# Patient Record
Sex: FEMALE | Race: WHITE | Employment: FULL TIME | URBAN - METROPOLITAN AREA
[De-identification: names, ages, dates, MRNs, and addresses within clinical notes are randomized per-mention and may not be internally consistent; named-entity substitution may affect disease eponyms.]

---

## 2017-01-23 ENCOUNTER — OFFICE VISIT (OUTPATIENT)
Dept: INTERNAL MEDICINE CLINIC | Age: 27
End: 2017-01-23

## 2017-01-23 VITALS
WEIGHT: 120 LBS | HEART RATE: 90 BPM | TEMPERATURE: 96.9 F | HEIGHT: 69 IN | OXYGEN SATURATION: 96 % | SYSTOLIC BLOOD PRESSURE: 118 MMHG | RESPIRATION RATE: 14 BRPM | BODY MASS INDEX: 17.77 KG/M2 | DIASTOLIC BLOOD PRESSURE: 78 MMHG

## 2017-01-23 DIAGNOSIS — G43.809 OTHER MIGRAINE WITHOUT STATUS MIGRAINOSUS, NOT INTRACTABLE: Primary | ICD-10-CM

## 2017-01-23 DIAGNOSIS — A41.9 SEPSIS, DUE TO UNSPECIFIED ORGANISM: ICD-10-CM

## 2017-01-23 DIAGNOSIS — R11.0 NAUSEA: ICD-10-CM

## 2017-01-23 LAB
HCG URINE, QL. (POC): NEGATIVE
VALID INTERNAL CONTROL?: YES

## 2017-01-23 RX ORDER — SUMATRIPTAN 25 MG/1
TABLET, FILM COATED ORAL
Qty: 9 TAB | Refills: 1 | Status: SHIPPED | OUTPATIENT
Start: 2017-01-23 | End: 2017-12-20

## 2017-01-23 NOTE — PATIENT INSTRUCTIONS
1) follow-up as needed or sooner if worsening symptoms. Migraine Headache: Care Instructions  Your Care Instructions  Migraines are painful, throbbing headaches that often start on one side of the head. They may cause nausea and vomiting and make you sensitive to light, sound, or smell. Without treatment, migraines can last from 4 hours to a few days. Medicines can help prevent migraines or stop them after they have started. Your doctor can help you find which ones work best for you. Follow-up care is a key part of your treatment and safety. Be sure to make and go to all appointments, and call your doctor if you are having problems. It's also a good idea to know your test results and keep a list of the medicines you take. How can you care for yourself at home? · Do not drive if you have taken a prescription pain medicine. · Rest in a quiet, dark room until your headache is gone. Close your eyes, and try to relax or go to sleep. Don't watch TV or read. · Put a cold, moist cloth or cold pack on the painful area for 10 to 20 minutes at a time. Put a thin cloth between the cold pack and your skin. · Use a warm, moist towel or a heating pad set on low to relax tight shoulder and neck muscles. · Have someone gently massage your neck and shoulders. · Take your medicines exactly as prescribed. Call your doctor if you think you are having a problem with your medicine. You will get more details on the specific medicines your doctor prescribes. · Be careful not to take pain medicine more often than the instructions allow. You could get worse or more frequent headaches when the medicine wears off. To prevent migraines  · Keep a headache diary so you can figure out what triggers your headaches. Avoiding triggers may help you prevent headaches. Record when each headache began, how long it lasted, and what the pain was like.  (Was it throbbing, aching, stabbing, or dull?) Write down any other symptoms you had with the headache, such as nausea, flashing lights or dark spots, or sensitivity to bright light or loud noise. Note if the headache occurred near your period. List anything that might have triggered the headache. Triggers may include certain foods (chocolate, cheese, wine) or odors, smoke, bright light, stress, or lack of sleep. · If your doctor has prescribed medicine for your migraines, take it as directed. You may have medicine that you take only when you get a migraine and medicine that you take all the time to help prevent migraines. ¨ If your doctor has prescribed medicine for when you get a headache, take it at the first sign of a migraine, unless your doctor has given you other instructions. ¨ If your doctor has prescribed medicine to prevent migraines, take it exactly as prescribed. Call your doctor if you think you are having a problem with your medicine. · Find healthy ways to deal with stress. Migraines are most common during or right after stressful times. Take time to relax before and after you do something that has caused a migraine in the past.  · Try to keep your muscles relaxed by keeping good posture. Check your jaw, face, neck, and shoulder muscles for tension. Try to relax them. When you sit at a desk, change positions often. And make sure to stretch for 30 seconds each hour. · Get plenty of sleep and exercise. · Eat meals on a regular schedule. Avoid foods and drinks that often trigger migraines. These include chocolate, alcohol (especially red wine and port), aspartame, monosodium glutamate (MSG), and some additives found in foods (such as hot dogs, jean baptiste, cold cuts, aged cheeses, and pickled foods). · Limit caffeine. Don't drink too much coffee, tea, or soda. But don't quit caffeine suddenly. That can also give you migraines. · Do not smoke or allow others to smoke around you. If you need help quitting, talk to your doctor about stop-smoking programs and medicines.  These can increase your chances of quitting for good. · If you are taking birth control pills or hormone therapy, talk to your doctor about whether they are triggering your migraines. When should you call for help? Call 911 anytime you think you may need emergency care. For example, call if:  · You have signs of a stroke. These may include:  ¨ Sudden numbness, paralysis, or weakness in your face, arm, or leg, especially on only one side of your body. ¨ Sudden vision changes. ¨ Sudden trouble speaking. ¨ Sudden confusion or trouble understanding simple statements. ¨ Sudden problems with walking or balance. ¨ A sudden, severe headache that is different from past headaches. Call your doctor now or seek immediate medical care if:  · You have new or worse nausea and vomiting. · You have a new or higher fever. · Your headache gets much worse. Watch closely for changes in your health, and be sure to contact your doctor if:  · You are not getting better after 2 days (48 hours). Where can you learn more? Go to http://alina-malena.info/. Enter T107 in the search box to learn more about \"Migraine Headache: Care Instructions. \"  Current as of: February 19, 2016  Content Version: 11.1  © 4533-5576 [employer]. Care instructions adapted under license by [employer] (which disclaims liability or warranty for this information). If you have questions about a medical condition or this instruction, always ask your healthcare professional. [place] [address] any warranty or liability for your use of this information.

## 2017-01-23 NOTE — PROGRESS NOTES
ROOM # 1    Bela Shaffer presents today for   Chief Complaint   Patient presents with    Headache       Bela Shaffer preferred language for health care discussion is english/other. Is someone accompanying this pt? no    Is the patient using any DME equipment during [address]? no    Depression Screening:  PHQ 2 / 9, over the last two weeks 5/10/2016   Little interest or pleasure in doing things Not at all   Feeling down, depressed or hopeless Not at all   Total Score PHQ 2 0       Learning Assessment:  Learning Assessment 5/10/2016   PRIMARY LEARNER Patient   PRIMARY LANGUAGE Jamaican   LEARNER PREFERENCE PRIMARY DEMONSTRATION     READING   ANSWERED BY patient   RELATIONSHIP SELF       Abuse Screening:  No flowsheet data found. Fall Risk  No flowsheet data found. Health Maintenance reviewed and discussed per provider. Yes    Advance Directive:  1. Do you have an advance directive in place? Patient Reply: no    2. If not, would you like material regarding how to put one in place? Patient Reply: no    Coordination of Care:  1. Have you been to the ER, urgent care clinic since your last visit? Hospitalized since your last visit? Yes pt was in hospital in [place] 9/2016 for sepsis and tonsillitis     2. Have you seen or consulted any other health care providers outside of the [facility] since your last visit? Include any pap smears or colon screening.  no

## 2017-01-23 NOTE — PROGRESS NOTES
Chief Complaint   Patient presents with    Headache       HPI:     Monica Diallo is a 27 y.o.  female with history of GERD  here for the above complaint. She has a headache for 1 week. She has dizziness and nausea. Headache is on the top of her head. When she feels dizzy, she has blurred vision. No vomiting. Lights makes her headaches worse. Her last period was 2 weeks. She has lower abdominal pain, but no chest pain or shortness of breath. No history of migraines. Sometimes the headache is sharp, but changes all the time. It is a constant headache. Pain scale: 5-6/10. She has tried advil without. No excedrin migraine. No nasal congestion, cough, itchy watery eyes. She was admitted on at :  [facility]   [address], [facility], [address] [place] [address]   [phone]    From 9/8/16-9/10/16 for tonsillitis and sepsis. She was given IV unasyn and discharged home with augmentin. Her WBC was elevated to 21. She needed repeat CBC in 1 week after discharge, but never followed up with me. Past Medical History   Diagnosis Date    GERD (gastroesophageal reflux disease)      History reviewed. No pertinent past surgical history. Current Outpatient Prescriptions   Medication Sig    SUMAtriptan (IMITREX) 25 mg tablet Take one po at onset of headaches and then one every 2hrs prn headaches with maximum of 4 in 24 hrs    norgestimate-ethinyl estradiol (ORTHO-CYCLEN) 0.25-35 mg-mcg tab Take 1 Tab by mouth daily. No current facility-administered medications for this visit. Health Maintenance   Topic Date Due    HPV AGE 9Y-34Y (1 of 3 - Female 3 Dose Series) 04/11/2003    DTaP/Tdap/Td series (1 - Tdap) 04/11/2013    PAP AKA CERVICAL CYTOLOGY  04/11/2013    INFLUENZA AGE 9 TO ADULT  Addressed       There is no immunization history on file for this patient. Patient's last menstrual period was 01/09/2017 (exact date).         Allergies and Intolerances:   No Known Allergies    Family History:   Family History   Problem Relation Age of Onset    Hypertension Mother     Hypertension Father        Social History:   She  reports that she has never smoked. She has never used smokeless tobacco.  She  reports that she drinks alcohol. ·     OBJECTIVE:   Physical exam:   Visit Vitals    /78    Pulse 90    Temp 96.9 °F (36.1 °C) (Oral)    Resp 14    Ht 5' 9\" (1.753 m)    Wt 120 lb (54.4 kg)    LMP 01/09/2017 (Exact Date)    SpO2 96%    BMI 17.72 kg/m2        Generally: Pleasant female in no acute distress  HEENT Exam: Head: atraumatic, acephalic                Eyes: PERRLA     Ears: bilaterally normal TM, no erythema or exudate, normal light reflex    Nares: mucosal membranes moist, no erythema    Mouth: Clear, no erythema or exudate    Neck: supple, no LAD, negative carotid bruits bilaterally    Cardiac Exam: regular, rate, and rhythm. Normal S1 and S2. No murmurs, gallops, or rubs  Pulmonary exam: Clear to auscultation bilaterally      LABS/RADIOLOGICAL TESTS:    All lab results and radiological studies were reviewed and discussed with the patient. ASSESSMENT/PLAN:    1. Other migraine without status migrainosus, not intractable  -     SUMAtriptan (IMITREX) 25 mg tablet; Take one po at onset of headaches and then one every 2hrs prn headaches with maximum of 4 in 24 hrs    2. Nausea  -     AMB POC URINE PREGNANCY TEST, VISUAL COLOR COMPARISON    3. Sepsis, due to unspecified organism ([facility] [address]  -     CBC WITH AUTOMATED DIFF; Future      4. Requested Prescriptions     Signed Prescriptions Disp Refills    SUMAtriptan (IMITREX) 25 mg tablet 9 Tab 1     Sig: Take one po at onset of headaches and then one every 2hrs prn headaches with maximum of 4 in 24 hrs     5. Patient verbalized understanding and agreement with the plan. 6. Patient was given an after-visit summary. 7.   Follow-up Disposition:  Return if symptoms worsen or fail to improve.  or sooner if worsening symptoms.           Jaymie Salcedo MD

## 2017-01-23 NOTE — MR AVS SNAPSHOT
Visit Information Date & Time Provider Department Dept. Phone Encounter #  
 1/23/2017  8:45 AM Ginger Vang MD [facility] [phone] [id number] Follow-up Instructions Return if symptoms worsen or fail to improve. Upcoming Health Maintenance Date Due  
 HPV AGE 9Y-34Y (1 of 3 - Female 3 Dose Series) 4/11/2003 DTaP/Tdap/Td series (1 - Tdap) 4/11/2013 PAP AKA CERVICAL CYTOLOGY 4/11/2013 Allergies as of 1/23/2017  Review Complete On: 1/23/2017 By: Wing Curry MD  
 No Known Allergies Current Immunizations  Reviewed on 5/10/2016 No immunizations on file. Not reviewed this visit You Were Diagnosed With   
  
 Codes Comments Other migraine without status migrainosus, not intractable    -  Primary ICD-10-CM: F60.700 Nausea     ICD-10-CM: R11.0 ICD-9-CM: 787.02 Sepsis, due to unspecified organism [facility])     ICD-10-CM: A41.9 ICD-9-CM: 038.9, 995.91 Vitals BP Pulse Temp Resp Height(growth percentile) Weight(growth percentile) 118/78 90 96.9 °F (36.1 °C) (Oral) 14 5' 9\" (1.753 m) 120 lb (54.4 kg) LMP SpO2 BMI OB Status Smoking Status 01/09/2017 (Exact Date) 96% 17.72 kg/m2 Having regular periods Never Smoker BMI and BSA Data Body Mass Index Body Surface Area  
 17.72 kg/m 2 1.63 m 2 Preferred Pharmacy Pharmacy Name Phone [facility] [address] [facility], [address] Your Updated Medication List  
  
   
This list is accurate as of: 1/23/17  9:27 AM.  Always use your most recent med list.  
  
  
  
  
 norgestimate-ethinyl estradiol 0.25-35 mg-mcg Tab Commonly known as:  Makayla Party Take 1 Tab by mouth daily. SUMAtriptan 25 mg tablet Commonly known as:  IMITREX Take one po at onset of headaches and then one every 2hrs prn headaches with maximum of 4 in 24 hrs Prescriptions Sent to Pharmacy Refills SUMAtriptan (IMITREX) 25 mg tablet 1 Sig: Take one po at onset of headaches and then one every 2hrs prn headaches with maximum of 4 in 24 hrs Class: Normal  
 Pharmacy: [address], [address] [place] [address] #: [phone] We Performed the Following AMB POC URINE PREGNANCY TEST, VISUAL COLOR COMPARISON [78604 CPT(R)] Follow-up Instructions Return if symptoms worsen or fail to improve. To-Do List   
 01/23/2017 Lab:  CBC WITH AUTOMATED DIFF Patient Instructions 1) follow-up as needed or sooner if worsening symptoms. Migraine Headache: Care Instructions Your Care Instructions Migraines are painful, throbbing headaches that often start on one side of the head. They may cause nausea and vomiting and make you sensitive to light, sound, or smell. Without treatment, migraines can last from 4 hours to a few days. Medicines can help prevent migraines or stop them after they have started. Your doctor can help you find which ones work best for you. Follow-up care is a key part of your treatment and safety. Be sure to make and go to all appointments, and call your doctor if you are having problems. It's also a good idea to know your test results and keep a list of the medicines you take. How can you care for yourself at home? · Do not drive if you have taken a prescription pain medicine. · Rest in a quiet, dark room until your headache is gone. Close your eyes, and try to relax or go to sleep. Don't watch TV or read. · Put a cold, moist cloth or cold pack on the painful area for 10 to 20 minutes at a time. Put a thin cloth between the cold pack and your skin. · Use a warm, moist towel or a heating pad set on low to relax tight shoulder and neck muscles. · Have someone gently massage your neck and shoulders. · Take your medicines exactly as prescribed.  Call your doctor if you think you are having a problem with your medicine. You will get more details on the specific medicines your doctor prescribes. · Be careful not to take pain medicine more often than the instructions allow. You could get worse or more frequent headaches when the medicine wears off. To prevent migraines · Keep a headache diary so you can figure out what triggers your headaches. Avoiding triggers may help you prevent headaches. Record when each headache began, how long it lasted, and what the pain was like. (Was it throbbing, aching, stabbing, or dull?) Write down any other symptoms you had with the headache, such as nausea, flashing lights or dark spots, or sensitivity to bright light or loud noise. Note if the headache occurred near your period. List anything that might have triggered the headache. Triggers may include certain foods (chocolate, cheese, wine) or odors, smoke, bright light, stress, or lack of sleep. · If your doctor has prescribed medicine for your migraines, take it as directed. You may have medicine that you take only when you get a migraine and medicine that you take all the time to help prevent migraines. ¨ If your doctor has prescribed medicine for when you get a headache, take it at the first sign of a migraine, unless your doctor has given you other instructions. ¨ If your doctor has prescribed medicine to prevent migraines, take it exactly as prescribed. Call your doctor if you think you are having a problem with your medicine. · Find healthy ways to deal with stress. Migraines are most common during or right after stressful times. Take time to relax before and after you do something that has caused a migraine in the past. 
· Try to keep your muscles relaxed by keeping good posture. Check your jaw, face, neck, and shoulder muscles for tension. Try to relax them. When you sit at a desk, change positions often. And make sure to stretch for 30 seconds each hour. · Get plenty of sleep and exercise. · Eat meals on a regular schedule. Avoid foods and drinks that often trigger migraines. These include chocolate, alcohol (especially red wine and port), aspartame, monosodium glutamate (MSG), and some additives found in foods (such as hot dogs, jean baptiste, cold cuts, aged cheeses, and pickled foods). · Limit caffeine. Don't drink too much coffee, tea, or soda. But don't quit caffeine suddenly. That can also give you migraines. · Do not smoke or allow others to smoke around you. If you need help quitting, talk to your doctor about stop-smoking programs and medicines. These can increase your chances of quitting for good. · If you are taking birth control pills or hormone therapy, talk to your doctor about whether they are triggering your migraines. When should you call for help? Call 911 anytime you think you may need emergency care. For example, call if: 
· You have signs of a stroke. These may include: 
¨ Sudden numbness, paralysis, or weakness in your face, arm, or leg, especially on only one side of your body. ¨ Sudden vision changes. ¨ Sudden trouble speaking. ¨ Sudden confusion or trouble understanding simple statements. ¨ Sudden problems with walking or balance. ¨ A sudden, severe headache that is different from past headaches. Call your doctor now or seek immediate medical care if: 
· You have new or worse nausea and vomiting. · You have a new or higher fever. · Your headache gets much worse. Watch closely for changes in your health, and be sure to contact your doctor if: 
· You are not getting better after 2 days (48 hours). Where can you learn more? Go to http://alina-malena.info/. Enter T698 in the search box to learn more about \"Migraine Headache: Care Instructions. \" Current as of: February 19, 2016 Content Version: 11.1 © 0833-5117 [employer].  Care instructions adapted under license by [address] (which disclaims liability or warranty for this information). If you have questions about a medical condition or this instruction, always ask your healthcare professional. [address] any warranty or liability for your use of this information. Introducing [facility]! Shelby Villalobos introduces [facility] patient portal. Now you can access parts of your medical record, email your doctor's office, and request medication refills online. 1. In your internet browser, go to [email or web address] 2. Click on the First Time User? Click Here link in the Sign In box. You will see the New Member Sign Up page. 3. Enter your [facility] Access Code exactly as it appears below. You will not need to use this code after youve completed the sign-up process. If you do not sign up before the expiration date, you must request a new code. · [facility] Access Code: [email or web address] Expires: 4/23/2017  9:27 AM 
 
4. Enter the last four digits of your Social Security Number (xxxx) and Date of Birth (mm/dd/yyyy) as indicated and click Submit. You will be taken to the next sign-up page. 5. Create a [facility] ID. This will be your [facility] login ID and cannot be changed, so think of one that is secure and easy to remember. 6. Create a [facility] password. You can change your password at any time. 7. Enter your Password Reset Question and Answer. This can be used at a later time if you forget your password. 8. Enter your e-mail address. You will receive e-mail notification when new information is available in [address] 9. Click Sign Up. You can now view and download portions of your medical record. 10. Click the Download Summary menu link to download a portable copy of your medical information. If you have questions, please visit the Frequently Asked Questions section of the [employer] website.  Remember, [facility] is NOT to be used for urgent needs. For medical emergencies, dial 911. Now available from your iPhone and Android! Please provide this summary of care documentation to your next provider. If you have any questions after today's visit, please call [phone].

## 2017-01-24 LAB
BASOPHILS # BLD AUTO: 0 X10E3/UL (ref 0–0.2)
BASOPHILS NFR BLD AUTO: 0 %
EOSINOPHIL # BLD AUTO: 0.3 X10E3/UL (ref 0–0.4)
EOSINOPHIL NFR BLD AUTO: 4 %
ERYTHROCYTE [DISTWIDTH] IN BLOOD BY AUTOMATED COUNT: 12.7 % (ref 12.3–15.4)
HCT VFR BLD AUTO: 40.8 % (ref 34–46.6)
HGB BLD-MCNC: 13.7 G/DL (ref 11.1–15.9)
IMM GRANULOCYTES # BLD: 0 X10E3/UL (ref 0–0.1)
IMM GRANULOCYTES NFR BLD: 0 %
LYMPHOCYTES # BLD AUTO: 3.2 X10E3/UL (ref 0.7–3.1)
LYMPHOCYTES NFR BLD AUTO: 48 %
MCH RBC QN AUTO: 30.2 PG (ref 26.6–33)
MCHC RBC AUTO-ENTMCNC: 33.6 G/DL (ref 31.5–35.7)
MCV RBC AUTO: 90 FL (ref 79–97)
MONOCYTES # BLD AUTO: 0.5 X10E3/UL (ref 0.1–0.9)
MONOCYTES NFR BLD AUTO: 8 %
NEUTROPHILS # BLD AUTO: 2.6 X10E3/UL (ref 1.4–7)
NEUTROPHILS NFR BLD AUTO: 40 %
PLATELET # BLD AUTO: 183 X10E3/UL (ref 150–379)
RBC # BLD AUTO: 4.53 X10E6/UL (ref 3.77–5.28)
WBC # BLD AUTO: 6.6 X10E3/UL (ref 3.4–10.8)

## 2017-12-20 ENCOUNTER — OFFICE VISIT (OUTPATIENT)
Dept: INTERNAL MEDICINE CLINIC | Age: 27
End: 2017-12-20

## 2017-12-20 VITALS
HEART RATE: 80 BPM | HEIGHT: 69 IN | SYSTOLIC BLOOD PRESSURE: 113 MMHG | DIASTOLIC BLOOD PRESSURE: 74 MMHG | TEMPERATURE: 97.5 F | WEIGHT: 125.4 LBS | BODY MASS INDEX: 18.57 KG/M2 | OXYGEN SATURATION: 99 % | RESPIRATION RATE: 18 BRPM

## 2017-12-20 DIAGNOSIS — R10.2 PELVIC PAIN: Primary | ICD-10-CM

## 2017-12-20 NOTE — PATIENT INSTRUCTIONS
1) you can take tylenol, but no more than 4000mg a day for pain. 2) follow-up as needed or sooner if worsening symptoms. Pelvic Pain: Care Instructions  Your Care Instructions    Pelvic pain, or pain in the lower belly, can have many causes. Often pelvic pain is not serious and gets better in a few days. If your pain continues or gets worse, you may need tests and treatment. Tell your doctor about any new symptoms. These may be signs of a serious problem. Follow-up care is a key part of your treatment and safety. Be sure to make and go to all appointments, and call your doctor if you are having problems. It's also a good idea to know your test results and keep a list of the medicines you take. How can you care for yourself at home? · Rest until you feel better. Lie down, and raise your legs by placing a pillow under your knees. · Drink plenty of fluids. You may find that small, frequent sips are easier on your stomach than if you drink a lot at once. Avoid drinks with carbonation or caffeine, such as soda pop, tea, or coffee. · Try eating several small meals instead of 2 or 3 large ones. Eat mild foods, such as rice, dry toast or crackers, bananas, and applesauce. Avoid fatty and spicy foods, other fruits, and alcohol until 48 hours after your symptoms have gone away. · Take an over-the-counter pain medicine, such as acetaminophen (Tylenol), ibuprofen (Advil, Motrin), or naproxen (Aleve). Read and follow all instructions on the label. · Do not take two or more pain medicines at the same time unless the doctor told you to. Many pain medicines have acetaminophen, which is Tylenol. Too much acetaminophen (Tylenol) can be harmful. · You can put a heating pad, a warm cloth, or moist heat on your belly to relieve pain. When should you call for help? Call your doctor now or seek immediate medical care if:  · You have a new or higher fever. · You have unusual vaginal bleeding.   · You have new or worse belly or pelvic pain. · You have vaginal discharge that has increased in amount or smells bad. Watch closely for changes in your health, and be sure to contact your doctor if:  · You do not get better as expected. Where can you learn more? Go to http://alina-malena.info/. Enter [phone] in the search box to learn more about \"Pelvic Pain: Care Instructions. \"  Current as of: October 13, 2016  Content Version: 11.4  © 3334-1925 [employer]. Care instructions adapted under license by [employer] (which disclaims liability or warranty for this information). If you have questions about a medical condition or this instruction, always ask your healthcare professional. [address] any warranty or liability for your use of this information.

## 2017-12-20 NOTE — MR AVS SNAPSHOT
Visit Information Date & Time Provider Department Dept. Phone Encounter #  
 12/20/2017  8:30 AM Eze Guevara MD [address] [phone] [id number] Follow-up Instructions Return if symptoms worsen or fail to improve. Upcoming Health Maintenance Date Due  
 HPV AGE 9Y-34Y (1 of 3 - Female 3 Dose Series) 4/11/2003 PAP AKA CERVICAL CYTOLOGY 4/11/2013 DTaP/Tdap/Td series (2 - Td) 12/20/2027 Allergies as of 12/20/2017  Review Complete On: 12/20/2017 By: Eze Guevara MD  
 No Known Allergies Current Immunizations  Reviewed on 5/10/2016 No immunizations on file. Not reviewed this visit You Were Diagnosed With   
  
 Codes Comments Pelvic pain    -  Primary ICD-10-CM: R10.2 ICD-9-CM: [id number] Vitals BP Pulse Temp Resp Height(growth percentile) Weight(growth percentile) 113/74 (BP 1 Location: Left arm, BP Patient Position: Sitting) 80 97.5 °F (36.4 °C) (Oral) 18 5' 9\" (1.753 m) 125 lb 6.4 oz (56.9 kg) LMP SpO2 BMI OB Status Smoking Status 11/15/2017 99% 18.52 kg/m2 Having regular periods Never Smoker Vitals History BMI and BSA Data Body Mass Index Body Surface Area 18.52 kg/m 2 1.66 m 2 Preferred Pharmacy Pharmacy Name Phone [facility] [address] [facility], [address] [place] [address] Your Updated Medication List  
  
Notice  As of 12/20/2017  9:05 AM  
 You have not been prescribed any medications. Follow-up Instructions Return if symptoms worsen or fail to improve. To-Do List   
 12/20/2017 Lab:  CBC WITH AUTOMATED DIFF   
  
 12/20/2017 Lab:  METABOLIC PANEL, COMPREHENSIVE   
  
 12/20/2017 Lab:  URINALYSIS W/ RFLX MICROSCOPIC   
  
 12/21/2017 Imaging:  US PELV NON OBS  LTD Patient Instructions 1) you can take tylenol, but no more than 4000mg a day for pain. 2) follow-up as needed or sooner if worsening symptoms. Pelvic Pain: Care Instructions Your Care Instructions Pelvic pain, or pain in the lower belly, can have many causes. Often pelvic pain is not serious and gets better in a few days. If your pain continues or gets worse, you may need tests and treatment. Tell your doctor about any new symptoms. These may be signs of a serious problem. Follow-up care is a key part of your treatment and safety. Be sure to make and go to all appointments, and call your doctor if you are having problems. It's also a good idea to know your test results and keep a list of the medicines you take. How can you care for yourself at home? · Rest until you feel better. Lie down, and raise your legs by placing a pillow under your knees. · Drink plenty of fluids. You may find that small, frequent sips are easier on your stomach than if you drink a lot at once. Avoid drinks with carbonation or caffeine, such as soda pop, tea, or coffee. · Try eating several small meals instead of 2 or 3 large ones. Eat mild foods, such as rice, dry toast or crackers, bananas, and applesauce. Avoid fatty and spicy foods, other fruits, and alcohol until 48 hours after your symptoms have gone away. · Take an over-the-counter pain medicine, such as acetaminophen (Tylenol), ibuprofen (Advil, Motrin), or naproxen (Aleve). Read and follow all instructions on the label. · Do not take two or more pain medicines at the same time unless the doctor told you to. Many pain medicines have acetaminophen, which is Tylenol. Too much acetaminophen (Tylenol) can be harmful. · You can put a heating pad, a warm cloth, or moist heat on your belly to relieve pain. When should you call for help? Call your doctor now or seek immediate medical care if: 
· You have a new or higher fever. · You have unusual vaginal bleeding. · You have new or worse belly or pelvic pain. · You have vaginal discharge that has increased in amount or smells bad. Watch closely for changes in your health, and be sure to contact your doctor if: 
· You do not get better as expected. Where can you learn more? Go to http://alina-malena.info/. Enter [phone] in the search box to learn more about \"Pelvic Pain: Care Instructions. \" Current as of: October 13, 2016 Content Version: 11.4 © 3718-4589 [employer]iva [employer]. Care instructions adapted under license by [employer] (which disclaims liability or warranty for this information). If you have questions about a medical condition or this instruction, always ask your healthcare professional. [address] any warranty or liability for your use of this information. Introducing [facility] & HEALTH SERVICES! Julito Dai introduces [employer] patient portal. Now you can access parts of your medical record, email your doctor's office, and request medication refills online. 1. In your internet browser, go to [email or web address] 2. Click on the First Time User? Click Here link in the Sign In box. You will see the New Member Sign Up page. 3. Enter your Merc[employer] Access Code exactly as it appears below. You will not need to use this code after youve completed the sign-up process. If you do not sign up before the expiration date, you must request a new code. · [employer] Access Code: [email or web address] Expires: 3/20/2018  9:05 AM 
 
4. Enter the last four digits of your Social Security Number (xxxx) and Date of Birth (mm/dd/yyyy) as indicated and click Submit. You will be taken to the next sign-up page. 5. Create a Merc[employer]ec ID. This will be your [employer] login ID and cannot be changed, so think of one that is secure and easy to remember. 6. Create a [employer] password. You can change your password at any time. 7. Enter your Password Reset Question and Answer.  This can be used at a later time if you forget your password. 8. Enter your e-mail address. You will receive e-mail notification when new information is available in [address] 9. Click Sign Up. You can now view and download portions of your medical record. 10. Click the Download Summary menu link to download a portable copy of your medical information. If you have questions, please visit the Frequently Asked Questions section of the Simple Life[employer] website. Remember, Simple Life[employer] is NOT to be used for urgent needs. For medical emergencies, dial 911. Now available from your iPhone and Android! Please provide this summary of care documentation to your next provider. If you have any questions after today's visit, please call [phone].

## 2017-12-20 NOTE — PROGRESS NOTES
Chief Complaint   Patient presents with    Pelvic Pain     2 weeks intermittenly       HPI:     Anastacia Phipps is a 24 y.o.  female with history of GERD  here for the above complaint. She is having lower pelvic pain for 2 weeks. Off and on. Sharp pain. Pain scale; 6/10. No dysuria, hematuria, increase urgency or frequency. Pt is not pregnant. No constipation or diarrhea. She said her periods have always been irregular. Sometimes every month or every other month. No chest pain, shortness of breath, abdominal pain, nausea or vomiting. She said her periods are normal in the sense, she does not get heavy bleeding. She said tylenol helps some with the pain. Past Medical History:   Diagnosis Date    GERD (gastroesophageal reflux disease)      History reviewed. No pertinent surgical history. No current outpatient prescriptions on file. No current facility-administered medications for this visit. Health Maintenance   Topic Date Due    HPV AGE 9Y-34Y (1 of 3 - Female 3 Dose Series) 04/11/2003    PAP AKA CERVICAL CYTOLOGY  04/11/2013    DTaP/Tdap/Td series (2 - Td) 12/20/2027    Influenza Age 9 to Adult  Addressed       There is no immunization history on file for this patient. Patient's last menstrual period was 11/15/2017. Allergies and Intolerances:   No Known Allergies    Family History:   Family History   Problem Relation Age of Onset    Hypertension Mother     Hypertension Father        Social History:   She  reports that she has never smoked. She has never used smokeless tobacco.  She  reports that she drinks alcohol.               ·     OBJECTIVE:   Physical exam:   Visit Vitals    /74 (BP 1 Location: Left arm, BP Patient Position: Sitting)    Pulse 80    Temp 97.5 °F (36.4 °C) (Oral)    Resp 18    Ht 5' 9\" (1.753 m)    Wt 125 lb 6.4 oz (56.9 kg)    LMP 11/15/2017    SpO2 99%    BMI 18.52 kg/m2        Generally: Pleasant female in no acute distress  Cardiac Exam: regular, rate, and rhythm. Normal S1 and S2. No murmurs, gallops, or rubs  Pulmonary exam: Clear to auscultation bilaterally  Abdominal exam: Positive bowel sounds in all four quadrants, soft, nondistended, TTP in the suprapubic area and bilateral pelvic areas  Extremities: 2+ dorsalis pedis pulses bilaterally. No pedal edema    bilaterally    LABS/RADIOLOGICAL TESTS:  Lab Results   Component Value Date/Time    WBC 6.6 01/23/2017 09:49 AM    HGB 13.7 01/23/2017 09:49 AM    HCT 40.8 01/23/2017 09:49 AM    PLATELET 749 59/24/0198 09:49 AM     Lab Results   Component Value Date/Time    Sodium 138 05/10/2016 01:51 PM    Potassium 3.9 05/10/2016 01:51 PM    Chloride 101 05/10/2016 01:51 PM    CO2 23 05/10/2016 01:51 PM    Glucose 82 05/10/2016 01:51 PM    BUN 8 05/10/2016 01:51 PM    Creatinine 0.40 05/10/2016 01:51 PM     No results found for: CHOL, CHOLX, CHLST, CHOLV, HDL, LDL, LDLC, DLDLP, TGLX, TRIGL, TRIGP  No results found for: GPT  Previous labs    ASSESSMENT/PLAN:    1. Pelvic pain: pt refuses pregnancy test. Will r/o other etiologies such as UTI, ovarian cyst., uterine fibroid. -     URINALYSIS W/ RFLX MICROSCOPIC; Future  -     METABOLIC PANEL, COMPREHENSIVE; Future  -     CBC WITH AUTOMATED DIFF; Future  -     US PELV NON OBS  LTD; Future    2. Patient verbalized understanding and agreement with the plan. 3. Patient was given an after-visit summary. 4. Follow-up Disposition:  Return if symptoms worsen or fail to improve. or sooner if worsening symptoms.           Tara Hagan M.D.

## 2017-12-20 NOTE — PROGRESS NOTES
ROOM # 2    Marcos Cheek presents today for   Chief Complaint   Patient presents with    Pelvic Pain     2 weeks intermittenly       Marcos Cheek preferred language for health care discussion is english/other. Is someone accompanying this pt? no    Is the patient using any DME equipment during OV? no    Depression Screening:  PHQ over the last two weeks 12/20/2017 5/10/2016   Little interest or pleasure in doing things Not at all Not at all   Feeling down, depressed or hopeless Not at all Not at all   Total Score PHQ 2 0 0       Learning Assessment:  Learning Assessment 5/10/2016   PRIMARY LEARNER Patient   PRIMARY LANGUAGE Citizen of [place]   LEARNER PREFERENCE PRIMARY DEMONSTRATION     READING   ANSWERED BY patient   RELATIONSHIP SELF       Abuse Screening:  Abuse Screening Questionnaire 12/20/2017   Do you ever feel afraid of your partner? N   Are you in a relationship with someone who physically or mentally threatens you? N   Is it safe for you to go home? Y       Fall Risk  No flowsheet data found. Health Maintenance reviewed and discussed per provider. Yes    Marcos Cheek is due for pap smear . Please order/place referral if appropriate. Advance Directive:  1. Do you have an advance directive in place? Patient Reply: no    2. If not, would you like material regarding how to put one in place? Patient Reply: no    Coordination of Care:  1. Have you been to the ER, urgent care clinic since your last visit? Hospitalized since your last visit? no    2. Have you seen or consulted any other health care providers outside of the [address] [place] [address] since your last visit? Include any pap smears or colon screening.  no

## 2017-12-21 LAB
ALBUMIN SERPL-MCNC: 4.5 G/DL (ref 3.5–5.5)
ALBUMIN/GLOB SERPL: 1.7 {RATIO} (ref 1.2–2.2)
ALP SERPL-CCNC: 55 IU/L (ref 39–117)
ALT SERPL-CCNC: 28 IU/L (ref 0–32)
APPEARANCE UR: CLEAR
AST SERPL-CCNC: 21 IU/L (ref 0–40)
BASOPHILS # BLD AUTO: 0 X10E3/UL (ref 0–0.2)
BASOPHILS NFR BLD AUTO: 0 %
BILIRUB SERPL-MCNC: 0.7 MG/DL (ref 0–1.2)
BILIRUB UR QL STRIP: NEGATIVE
BUN SERPL-MCNC: 10 MG/DL (ref 6–20)
BUN/CREAT SERPL: 20 (ref 9–23)
CALCIUM SERPL-MCNC: 9 MG/DL (ref 8.7–10.2)
CHLORIDE SERPL-SCNC: 102 MMOL/L (ref 96–106)
CO2 SERPL-SCNC: 23 MMOL/L (ref 18–29)
COLOR UR: YELLOW
CREAT SERPL-MCNC: 0.49 MG/DL (ref 0.57–1)
EOSINOPHIL # BLD AUTO: 0.2 X10E3/UL (ref 0–0.4)
EOSINOPHIL NFR BLD AUTO: 2 %
ERYTHROCYTE [DISTWIDTH] IN BLOOD BY AUTOMATED COUNT: 13 % (ref 12.3–15.4)
GLOBULIN SER CALC-MCNC: 2.6 G/DL (ref 1.5–4.5)
GLUCOSE SERPL-MCNC: 89 MG/DL (ref 65–99)
GLUCOSE UR QL: NEGATIVE
HCT VFR BLD AUTO: 40.5 % (ref 34–46.6)
HGB BLD-MCNC: 13.3 G/DL (ref 11.1–15.9)
HGB UR QL STRIP: NEGATIVE
IMM GRANULOCYTES # BLD: 0 X10E3/UL (ref 0–0.1)
IMM GRANULOCYTES NFR BLD: 0 %
KETONES UR QL STRIP: NEGATIVE
LEUKOCYTE ESTERASE UR QL STRIP: NEGATIVE
LYMPHOCYTES # BLD AUTO: 3.1 X10E3/UL (ref 0.7–3.1)
LYMPHOCYTES NFR BLD AUTO: 45 %
MCH RBC QN AUTO: 30.2 PG (ref 26.6–33)
MCHC RBC AUTO-ENTMCNC: 32.8 G/DL (ref 31.5–35.7)
MCV RBC AUTO: 92 FL (ref 79–97)
MICRO URNS: NORMAL
MONOCYTES # BLD AUTO: 0.6 X10E3/UL (ref 0.1–0.9)
MONOCYTES NFR BLD AUTO: 9 %
NEUTROPHILS # BLD AUTO: 3 X10E3/UL (ref 1.4–7)
NEUTROPHILS NFR BLD AUTO: 44 %
NITRITE UR QL STRIP: NEGATIVE
PH UR STRIP: 5.5 [PH] (ref 5–7.5)
PLATELET # BLD AUTO: 177 X10E3/UL (ref 150–379)
POTASSIUM SERPL-SCNC: 4 MMOL/L (ref 3.5–5.2)
PROT SERPL-MCNC: 7.1 G/DL (ref 6–8.5)
PROT UR QL STRIP: NEGATIVE
RBC # BLD AUTO: 4.41 X10E6/UL (ref 3.77–5.28)
SODIUM SERPL-SCNC: 137 MMOL/L (ref 134–144)
SP GR UR: 1.02 (ref 1–1.03)
UROBILINOGEN UR STRIP-MCNC: 0.2 MG/DL (ref 0.2–1)
WBC # BLD AUTO: 6.9 X10E3/UL (ref 3.4–10.8)

## 2018-01-08 ENCOUNTER — TELEPHONE (OUTPATIENT)
Dept: INTERNAL MEDICINE CLINIC | Age: 28
End: 2018-01-08

## 2018-01-08 DIAGNOSIS — R10.2 PELVIC PAIN: Primary | ICD-10-CM

## 2018-01-08 NOTE — TELEPHONE ENCOUNTER
2 pt. Identifiers confirmed. Pt. Notified of below. She states that yes, she is still having some pelvic pain and she is okay c doing CT. No other questions/concerns at this time.

## 2018-01-08 NOTE — TELEPHONE ENCOUNTER
Please let pt know that pelvic/transvaginal ultrasound was negative. Uterus and ovaries were normal. Is she still having pelvic pain? If so, we can do CT abd/pelvis.

## 2018-01-23 ENCOUNTER — HOSPITAL ENCOUNTER (OUTPATIENT)
Dept: CT IMAGING | Age: 28
Discharge: HOME | End: 2018-01-23
Attending: INTERNAL MEDICINE
Payer: COMMERCIAL

## 2018-01-23 DIAGNOSIS — R10.2 PELVIC PAIN: ICD-10-CM

## 2018-01-23 PROCEDURE — 74011636320 HC RX REV CODE- 636/320: Performed by: INTERNAL MEDICINE

## 2018-01-23 PROCEDURE — 74177 CT ABD & PELVIS W/CONTRAST: CPT

## 2018-01-23 PROCEDURE — 74011000255 HC RX REV CODE- 255: Performed by: INTERNAL MEDICINE

## 2018-01-23 RX ORDER — BARIUM SULFATE 20 MG/ML
900 SUSPENSION ORAL
Status: COMPLETED | OUTPATIENT
Start: 2018-01-23 | End: 2018-01-23

## 2018-01-23 RX ADMIN — IOPAMIDOL 89 ML: 612 INJECTION, SOLUTION INTRAVENOUS at 09:08

## 2018-01-23 RX ADMIN — BARIUM SULFATE 900 ML: 20 SUSPENSION ORAL at 09:07

## 2018-01-23 NOTE — PROGRESS NOTES
Please let pt know that CT scan showed:    1) she has bilateral ovarian cysts. 2) left ovary is the dominant cyst measuring 4.3. X 3.4. CM. This is probably causing her pain. 3) does she have an ob/gyn? If not, we can refer her to one.

## 2018-01-26 ENCOUNTER — TELEPHONE (OUTPATIENT)
Dept: INTERNAL MEDICINE CLINIC | Age: 28
End: 2018-01-26

## 2018-01-29 NOTE — TELEPHONE ENCOUNTER
Notes Recorded by Taniya Gaming MD on 1/23/2018 at 4:19 PM  Please let pt know that CT scan showed:    1) she has bilateral ovarian cysts. 2) left ovary is the dominant cyst measuring 4.3. X 3.4. CM. This is probably causing her pain. 3) does she have an ob/gyn? If not, we can refer her to one.

## 2018-01-29 NOTE — TELEPHONE ENCOUNTER
Attempted to reach patient regarding ct results. No answer; left message for pt to return call to the office at [phone].  Will continue to try to contact patient

## 2018-01-30 NOTE — TELEPHONE ENCOUNTER
Patient was given results and number to [address] Scot Hardy office. Patient states she will call them and make appointment.

## 2018-03-15 ENCOUNTER — OFFICE VISIT (OUTPATIENT)
Dept: OBGYN CLINIC | Age: 28
End: 2018-03-15

## 2018-03-15 ENCOUNTER — OFFICE VISIT (OUTPATIENT)
Dept: INTERNAL MEDICINE CLINIC | Age: 28
End: 2018-03-15

## 2018-03-15 VITALS
HEIGHT: 69 IN | WEIGHT: 125 LBS | SYSTOLIC BLOOD PRESSURE: 133 MMHG | HEART RATE: 102 BPM | DIASTOLIC BLOOD PRESSURE: 78 MMHG | BODY MASS INDEX: 18.51 KG/M2

## 2018-03-15 VITALS
HEART RATE: 81 BPM | WEIGHT: 126.8 LBS | SYSTOLIC BLOOD PRESSURE: 113 MMHG | OXYGEN SATURATION: 99 % | HEIGHT: 69 IN | RESPIRATION RATE: 15 BRPM | DIASTOLIC BLOOD PRESSURE: 67 MMHG | BODY MASS INDEX: 18.78 KG/M2 | TEMPERATURE: 96.2 F

## 2018-03-15 DIAGNOSIS — G89.29 CHRONIC LEFT SHOULDER PAIN: Primary | ICD-10-CM

## 2018-03-15 DIAGNOSIS — M25.512 CHRONIC LEFT SHOULDER PAIN: Primary | ICD-10-CM

## 2018-03-15 DIAGNOSIS — N83.202 LEFT OVARIAN CYST: ICD-10-CM

## 2018-03-15 DIAGNOSIS — R10.2 PELVIC PAIN IN FEMALE: Primary | ICD-10-CM

## 2018-03-15 NOTE — PROGRESS NOTES
23 y/o G0 presents to the office as a new patient. She went to her PCP for lower pelvic pain. She notes she has a long history of irregular cycles. 5 years ago, she was placed on OCPs for 2 years and had normal cycles. She stopped taking the pills since she is not sexually active. She notes her cycles are now irregular. She describes the pain as sharp and some what constant when present. The pain is worse at the end of the cycle. Pain medication doesn't help the pain. She doesn't have pain today. She was seen by her PCP on 1/3, who ordered an ultrasound, which was normal.  Due to continued pain, CT was ordered and resulted on 1/23 as functional 4 cm left ovarian cyst with un-characterized right ovarian cyst.  She denies any abnormal discharge. Active Ambulatory Problems     Diagnosis Date Noted    GERD (gastroesophageal reflux disease)     Left ovarian cyst 03/15/2018    Pelvic pain in female 03/15/2018     Resolved Ambulatory Problems     Diagnosis Date Noted    No Resolved Ambulatory Problems     Past Medical History:   Diagnosis Date    GERD (gastroesophageal reflux disease)     Left ovarian cyst 3/15/2018    Pelvic pain in female 3/15/2018     Visit Vitals    /78    Pulse (!) 102    Ht 5' 9\" (1.753 m)    Wt 125 lb (56.7 kg)    LMP 03/08/2018    BMI 18.46 kg/m2     Gen: A&OX3, NAD  Abdomen: soft, flat, NTTP  SVE: NEFG, cervix appears normal. No lesions or abnormal discharge  BME: small, mobile uterus- NTTP, ovaries palpable bilateral- small, ovoid, no appreciable masses    CT: PELVIC ORGANS (1/23/18): Uterus is anteverted. Small amount of pelvic free fluid,  nonspecific, and likely physiologic. Bilateral ovarian cysts. Both ovaries are  visualized. The left ovary contains a dominant cyst measuring 4.3 x 3.4 cm. Ultrasound (1/3/18): normal appearing without cysts, or mass.     A/P:  Intermittent pelvic pain with benign exam.  Reviewed common sources of pain in a pre-menopausal woman in detail, including ovarian cyst, ovulation pain, unknown etilogy, non-GYN source. Recommend pain diary, since pain not present today to further diagnose. TV-U/S ordered to re-assess ovarian cyst seen on CT dated 1/23/18. The plan of care has been discussed with the patient. She has been given the opportunity to ask questions, which seem to have been answered satisfactorily. 30 minutes spent with this patient.

## 2018-03-15 NOTE — MR AVS SNAPSHOT
[address]i [address] [place] [address] [facility] [address] [facility] [address] [address] 
[phone] Patient: Cierra Nobles MRN: [id number] LAF:[id number] Visit Information Date & Time Provider Department Dept. Phone Encounter #  
 3/15/2018  3:00 PM Clarisse Kilpatrick MD [facility] [place] [phone] [id number] Follow-up Instructions Return in about 4 weeks (around 4/12/2018) for results. Upcoming Health Maintenance Date Due  
 HPV AGE 9Y-34Y (1 of 3 - Female 3 Dose Series) 4/11/2003 PAP AKA CERVICAL CYTOLOGY 4/11/2013 Allergies as of 3/15/2018  Review Complete On: 3/15/2018 By: Clarisse Kilpatrick MD  
 No Known Allergies Current Immunizations  Reviewed on 5/10/2016 No immunizations on file. Not reviewed this visit You Were Diagnosed With   
  
 Codes Comments Pelvic pain in female    -  Primary ICD-10-CM: R10.2 ICD-9-CM: 625.9 Left ovarian cyst     ICD-10-CM: H52.338 ICD-9-CM: 620.2 Vitals BP Pulse Height(growth percentile) Weight(growth percentile) LMP BMI  
 133/78 (!) 102 5' 9\" (1.753 m) 125 lb (56.7 kg) 03/08/2018 18.46 kg/m2 OB Status Smoking Status Having regular periods Never Smoker BMI and BSA Data Body Mass Index Body Surface Area  
 18.46 kg/m 2 1.66 m 2 Preferred Pharmacy Pharmacy Name Phone [address] [phone] Your Updated Medication List  
  
Notice  As of 3/15/2018  4:09 PM  
 You have not been prescribed any medications. Follow-up Instructions Return in about 4 weeks (around 4/12/2018) for results. To-Do List   
 04/02/2018 Imaging:  US PELV NON OB W TV   
  
  
Patient Instructions Abnormal Uterine Bleeding: Care Instructions Your Care Instructions Abnormal uterine bleeding (AUB) is irregular bleeding from the uterus that is longer or heavier than usual or does not occur at your regular time. Sometimes it is caused by changes in hormone levels. It can also be caused by growths in the uterus, such as fibroids or polyps. Sometimes a cause cannot be found. You may have heavy bleeding when you are not expecting your period. Your doctor may suggest a pregnancy test, if you think you are pregnant. Follow-up care is a key part of your treatment and safety. Be sure to make and go to all appointments, and call your doctor if you are having problems. It's also a good idea to know your test results and keep a list of the medicines you take. How can you care for yourself at home? · Be safe with medicines. Take pain medicines exactly as directed. ¨ If the doctor gave you a prescription medicine for pain, take it as prescribed. ¨ If you are not taking a prescription pain medicine, ask your doctor if you can take an over-the-counter medicine. · You may be low in iron because of blood loss. Eat a balanced diet that is high in iron and vitamin C. Foods rich in iron include red meat, shellfish, eggs, beans, and leafy green vegetables. Talk to your doctor about whether you need to take iron pills or a multivitamin. When should you call for help? Call 911 anytime you think you may need emergency care. For example, call if: 
? · You passed out (lost consciousness). ?Call your doctor now or seek immediate medical care if: 
? · You have new or worse belly or pelvic pain. ? · You have severe vaginal bleeding. ? · You feel dizzy or lightheaded, or you feel like you may faint. ? Watch closely for changes in your health, and be sure to contact your doctor if: 
? · You think you may be pregnant. ? · Your bleeding gets worse. ? · You do not get better as expected. Where can you learn more? Go to http://alina-malena.info/. Enter N190 in the search box to learn more about \"Abnormal Uterine Bleeding: Care Instructions. \" 
 Current as of: October 13, 2016 Content Version: 11.4 © 4915-1918 [employer]. Care instructions adapted under license by [employer]utabout (which disclaims liability or warranty for this information). If you have questions about a medical condition or this instruction, always ask your healthcare professional. [address] any warranty or liability for your use of this information. Functional Ovarian Cyst: Care Instructions Your Care Instructions A functional ovarian cyst is a sac that forms on the surface of a woman's ovary during ovulation. The sac holds a maturing egg. Usually the sac goes away after the egg is released. But if the egg is not released, or if the sac closes up after the egg is released, the sac can swell up with fluid and form a cyst. 
Functional ovarian cysts are different than ovarian growths caused by other problems, such as cancer. Most functional ovarian cysts cause no symptoms and go away on their own. Some cause mild pain. Others can cause severe pain when they rupture or bleed. Follow-up care is a key part of your treatment and safety. Be sure to make and go to all appointments, and call your doctor if you are having problems. It's also a good idea to know your test results and keep a list of the medicines you take. How can you care for yourself at home? · Use heat, such as a hot water bottle, a heating pad set on low, or a warm bath, to relax tense muscles and relieve cramping. · Be safe with medicines. Take pain medicines exactly as directed. ¨ If the doctor gave you a prescription medicine for pain, take it as prescribed. ¨ If you are not taking a prescription pain medicine, ask your doctor if you can take an over-the-counter medicine. · Avoid constipation. Make sure you drink enough fluids and include fruits, vegetables, and fiber in your diet each day.  Constipation does not cause ovarian cysts, but it may make your pelvic pain worse. When should you call for help? Call your doctor now or seek immediate medical care if: 
? · You have severe vaginal bleeding. ? · You have new or worse belly or pelvic pain. ? Watch closely for changes in your health, and be sure to contact your doctor if: 
? · You have unusual vaginal bleeding. ? · You do not get better as expected. Where can you learn more? Go to http://alina-malena.info/. Enter M095 in the search box to learn more about \"Functional Ovarian Cyst: Care Instructions. \" Current as of: October 13, 2016 Content Version: 11.4 © 0440-8635 [employer]. Care instructions adapted under license by [employer] (which disclaims liability or warranty for this information). If you have questions about a medical condition or this instruction, always ask your healthcare professional. [place] [address] any warranty or liability for your use of this information. Introducing [facility] & HEALTH SERVICES! [employer] introduces [employer] patient portal. Now you can access parts of your medical record, email your doctor's office, and request medication refills online. 1. In your internet browser, go to [email or web address] 2. Click on the First Time User? Click Here link in the Sign In box. You will see the New Member Sign Up page. 3. Enter your [employer] Access Code exactly as it appears below. You will not need to use this code after youve completed the sign-up process. If you do not sign up before the expiration date, you must request a new code. · [employer] Access Code: [email or web address] Expires: 3/20/2018 10:05 AM 
 
4. Enter the last four digits of your Social Security Number (xxxx) and Date of Birth (mm/dd/yyyy) as indicated and click Submit. You will be taken to the next sign-up page. 5. Create a [employer] ID.  This will be your [employer] login ID and cannot be changed, so think of one that is secure and easy to remember. 6. Create a Proficiency password. You can change your password at any time. 7. Enter your Password Reset Question and Answer. This can be used at a later time if you forget your password. 8. Enter your e-mail address. You will receive e-mail notification when new information is available in [address] 9. Click Sign Up. You can now view and download portions of your medical record. 10. Click the Download Summary menu link to download a portable copy of your medical information. If you have questions, please visit the Frequently Asked Questions section of the Proficiency website. Remember, Proficiency is NOT to be used for urgent needs. For medical emergencies, dial 911. Now available from your iPhone and Android! Please provide this summary of care documentation to your next provider. Your primary care clinician is listed as Safia Nixon. If you have any questions after today's visit, please call [phone].

## 2018-03-15 NOTE — MR AVS SNAPSHOT
[address] [place] [address] 
 
 
 [facility] [address] [facility] [address] [address] [phone] Patient: Angelo Liang MRN: [id number] PSX:[id number] Visit Information Date & Time Provider Department Dept. Phone Encounter #  
 3/15/2018 10:00 AM Hilario Carrera MD [facility] | Anywhere [phone] [id number] Follow-up Instructions Return if symptoms worsen or fail to improve. Your Appointments 3/15/2018  3:00 PM  
New Patient with Mynor Guillen MD  
[address] ([facility]) Appt Note: NEW PATIENT, OVARIAN CYST,  
 [address] [facility] [address]  
[phone]  
  
   
 [address] Upcoming Health Maintenance Date Due  
 HPV AGE 9Y-34Y (1 of 3 - Female 3 Dose Series) 4/11/2003 PAP AKA CERVICAL CYTOLOGY 4/11/2013 DTaP/Tdap/Td series (2 - Td) 12/20/2027 Allergies as of 3/15/2018  Review Complete On: 3/15/2018 By: Hilario Carrera MD  
 No Known Allergies Current Immunizations  Reviewed on 5/10/2016 No immunizations on file. Not reviewed this visit You Were Diagnosed With   
  
 Codes Comments Chronic left shoulder pain    -  Primary ICD-10-CM: M25.512, C75.02 ICD-9-CM: 719.41, 338.29 Vitals BP Pulse Temp Resp Height(growth percentile) Weight(growth percentile) 113/67 (BP 1 Location: Left arm, BP Patient Position: Sitting) 81 96.2 °F (35.7 °C) (Oral) 15 5' 9\" (1.753 m) 126 lb 12.8 oz (57.5 kg) LMP SpO2 BMI OB Status Smoking Status 03/08/2018 99% 18.73 kg/m2 Having regular periods Never Smoker Vitals History BMI and BSA Data Body Mass Index Body Surface Area 18.73 kg/m 2 1.67 m 2 Preferred Pharmacy Pharmacy Name Phone [address] [facility] Sonny Amezcua [phone] Your Updated Medication List  
  
Notice  As of 3/15/2018 10:12 AM  
 You have not been prescribed any medications. Follow-up Instructions Return if symptoms worsen or fail to improve. To-Do List   
 03/15/2018 Imaging:  XR SHOULDER LT AP/LAT MIN 2 V Patient Instructions 1) Take tylenol for pain. Don't use more than 4000mg a day for pain. 2) Use heating pad, icy/hot, tiger balm for pain. 3) follow-up as needed or sooner if worsening symptoms. Shoulder Blade: Exercises Your Care Instructions Here are some examples of typical exercises for your condition. Start each exercise slowly. Ease off the exercise if you start to have pain. Your doctor or physical therapist will tell you when you can start these exercises and which ones will work best for you. How to do the exercises Shoulder roll 1. Stand tall with your chin slightly tucked. Imagine that a string at the top of your head is pulling you straight up. 2. Keep your arms relaxed. All motion will be in your shoulders. 3. Shrug your shoulders up toward your ears, then up and back. [place] your shoulders down and back, like you're sliding your hands down into your back pants pockets. 4. Repeat the circles at least 2 to 4 times. 5. This exercise is also helpful anytime you want to relax. Lower neck and upper back stretch 1. With your arms about shoulder height, clasp your hands in front of you. 2. Drop your chin toward your chest. 
3. Reach straight forward so you are rounding your upper back. Think about pulling your shoulder blades apart. Denisa Haley feel a stretch across your upper back and shoulders. Hold for at least 6 seconds. 4. Repeat 2 to 4 times. Triceps stretch 1. Reach your arm straight up. 2. Keeping your elbow in place, bend your arm and reach your hand down behind your back. 3. With your other hand, apply gentle pressure to the bent elbow. Denisa Haley feel a stretch at the back of your upper arm and shoulder. Hold about 6 seconds. 4. Repeat 2 to 4 times with each arm. Shoulder stretch 1. Relax your shoulders. 2. Raise one arm to shoulder height, and reach it across your chest. 
3. Pull the arm slightly toward you with your other arm. This will help you get a gentle stretch. Hold for about 6 seconds. 4. Repeat 2 to 4 times. Shoulder blade squeeze 1. Sit or stand up tall with your arms at your sides. 2. Keep your shoulders relaxed and down, not shrugged. 3. Squeeze your shoulder blades together. Hold for 6 seconds, then relax. 4. Repeat 8 to 12 times. Straight-arm shoulder blade squeeze 1. Sit or stand tall. Relax your shoulders. 2. With palms down, hold your elastic tubing or band straight out in front of you. 3. Start with slight tension in the tubing or band, with your hands about shoulder-width apart. 4. Slowly pull straight out to the sides, squeezing your shoulder blades together. Keep your arms straight and at shoulder height. Slowly release. 5. Repeat 8 to 12 times. Rowing 1. [place] your elastic tubing or band at about waist height. Take one end in each hand. 2. Sit or stand with your feet hip-width apart. 3. Hold your arms straight in front of you. Adjust your distance to create slight tension in the tubing or band. 4. Slightly tuck your chin. Relax your shoulders. 5. Without shrugging your shoulders, pull straight back. Your elbows will pass alongside your waist. 
Pull-downs 1. [place] your elastic tubing or band in the top of a closed door. Take one end in each hand. 2. Either sit or stand, depending on what is more comfortable. If you feel unsteady, sit on a chair. 3. Start with your arms up and comfortably apart, elbows straight. There should be a slight tension in the tubing or band. 4. Slightly tuck your chin, and look straight ahead. 5. Keeping your back straight, slowly pull down and back, bending your elbows. 6. Stop where your hands are level with your chin, in a \"goalpost\" position. 7. Repeat 8 to 12 times. Chest T stretch 1. Lie on your back. Raise your knees so they are bent. Plant your feet on the floor, hip-width apart. 2. Tuck your chin, and relax your shoulders. 3. Reach your arms straight out to the sides. If you don't feel a mild stretch in your shoulders and across your chest, use a foam roll or a tightly rolled blanket under your spine, from your tailbone to your head. 4. Relax in this position for at least 15 to 30 seconds while you breathe normally. Repeat 2 to 4 times. 5. As you get used to this stretch, keep adding a little more time until you are able relax in this position for 2 or 3 minutes. When you can relax for at least 2 minutes, you only need to do the exercise 1 time per session. Chest goalpost stretch 1. Lie on your back. Raise your knees so they are bent. Plant your feet on the floor, hip-width apart. 2. Tuck your chin, and relax your shoulders. 3. Reach your arms straight out to the sides. 4. Bend your arms at the elbows, with your hands pointed toward the top of your head. Your arms should make an L on either side of your head. Your palms should be facing up. 5. If you don't feel a mild stretch in your shoulders and across your chest, use a foam roll or tightly rolled blanket under your spine, from your tailbone to your head. 6. Relax in this position for at least 15 to 30 seconds while you breathe normally. Repeat 2 to 4 times. 7. Each day you do this exercise, add a little more time until you can relax in this position for 2 or 3 minutes. When you can relax for at least 2 minutes, you only need to do the exercise 1 time per session. Follow-up care is a key part of your treatment and safety. Be sure to make and go to all appointments, and call your doctor if you are having problems. It's also a good idea to know your test results and keep a list of the medicines you take. Where can you learn more? Go to http://alina-malena.info/. Enter ([phone] [phone] in the search box to learn more about \"Shoulder Blade: Exercises. \" Current as of: March 21, 2017 Content Version: 11.4 © 9073-9827 [employer]. Care instructions adapted under license by [employer] (which disclaims liability or warranty for this information). If you have questions about a medical condition or this instruction, always ask your healthcare professional. [address] any warranty or liability for your use of this information. Shoulder Instability: Exercises Your Care Instructions Here are some examples of typical rehabilitation exercises for your condition. Start each exercise slowly. Ease off the exercise if you start to have pain. Your doctor or physical therapist will tell you when you can start these exercises and which ones will work best for you. How to do the exercises Shoulder flexion (lying down) To make a wand, use a piece of PVC pipe or a broom handle with the broom removed. Make the wand about a foot wider than your shoulders. 6. Lie on your back, holding a wand with both hands. Your palms should face down as you hold the wand. 7. Keeping your elbows straight, slowly raise your arms over your head. Raise them until you feel a stretch in your shoulders, upper back, and chest. 
8. Hold for 15 to 30 seconds. 9. Repeat 2 to 4 times. Shoulder blade squeeze 5. Stand with your arms at your sides, and squeeze your shoulder blades together. Do not raise your shoulders up as you squeeze. 6. Hold for 6 seconds. 7. Repeat 8 to 12 times. Resisted rows For this exercise, you will need elastic exercise material, such as surgical tubing or Thera-Band. 5. Put the band around a solid object at about waist level. (A bedpost will work well.) Each hand should hold an end of the band.  
6. With your elbows at your sides and bent to 90 degrees, pull the band back. Your shoulder blades should move toward each other. Return to the starting position. 7. Repeat 8 to 12 times. Internal rotator strengthening exercise 5. Start by tying a piece of elastic exercise material to a doorknob. You can use surgical tubing or Thera-Band. 6. Stand or sit with your shoulder relaxed and your elbow bent 90 degrees. Your upper arm should rest comfortably against your side. Squeeze a rolled towel between your elbow and your body for comfort. This will help keep your arm at your side. 7. Hold one end of the elastic band in the hand of the painful arm. 8. Slowly rotate your forearm toward your body until it touches your belly. Slowly move it back to where you started. 9. Keep your elbow and upper arm firmly tucked against the towel roll or at your side. 10. Repeat 8 to 12 times. External rotator strengthening exercise 5. Start by tying a piece of elastic exercise material to a doorknob. You can use surgical tubing or Thera-Band. (You may also hold one end of the band in each hand.) 6. Stand or sit with your shoulder relaxed and your elbow bent 90 degrees. Your upper arm should rest comfortably against your side. Squeeze a rolled towel between your elbow and your body for comfort. This will help keep your arm at your side. 7. Hold one end of the elastic band with the hand of the painful arm. 8. Start with your forearm across your belly. Slowly rotate the forearm out away from your body. Keep your elbow and upper arm tucked against the towel roll or the side of your body until you begin to feel tightness in your shoulder. Slowly move your arm back to where you started. 9. Repeat 8 to 12 times. Follow-up care is a key part of your treatment and safety. Be sure to make and go to all appointments, and call your doctor if you are having problems. It's also a good idea to know your test results and keep a list of the medicines you take. Where can you learn more? Go to http://alina-malena.info/. Enter Q580 in the search box to learn more about \"Shoulder Instability: Exercises. \" Current as of: March 21, 2017 Content Version: 11.4 © 5871-5223 [employer]. Care instructions adapted under license by [employer] (which disclaims liability or warranty for this information). If you have questions about a medical condition or this instruction, always ask your healthcare professional. [address] any warranty or liability for your use of this information. Shoulder Stretches: Exercises Your Care Instructions Here are some examples of exercises for your shoulder. Start each exercise slowly. Ease off the exercise if you start to have pain. Your doctor or physical therapist will tell you when you can start these exercises and which ones will work best for you. How to do the exercises Shoulder stretch 10.  a doorway and place one arm against the door frame. Your elbow should be a little higher than your shoulder. 11. Relax your shoulders as you lean forward, allowing your chest and shoulder muscles to stretch. You can also turn your body slightly away from your arm to stretch the muscles even more. 12. Hold for 15 to 30 seconds. 13. Repeat 2 to 4 times with each arm. Shoulder and chest stretch 8. Shoulder and chest stretch 9. While sitting, relax your upper body so you slump slightly in your chair. 10. As you breathe in, straighten your back and open your arms out to the sides. 11. Gently pull your shoulder blades back and downward. 12. Hold for 15 to 30 seconds as your breathe normally. 13. Repeat 2 to 4 times. Overhead stretch 8. Reach up over your head with both arms. 9. Hold for 15 to 30 seconds. 10. Repeat 2 to 4 times. Follow-up care is a key part of your treatment and safety.  Be sure to make and go to all appointments, and call your doctor if you are having problems. It's also a good idea to know your test results and keep a list of the medicines you take. Where can you learn more? Go to http://alina-malena.info/. Enter S254 in the search box to learn more about \"Shoulder Stretches: Exercises. \" Current as of: March 21, 2017 Content Version: 11.4 © 7197-7028 [employer]. Care instructions adapted under license by [employer] (which disclaims liability or warranty for this information). If you have questions about a medical condition or this instruction, always ask your healthcare professional. [address] any warranty or liability for your use of this information. Introducing [facility]! Janie Marroquin introduces [employer] patient portal. Now you can access parts of your medical record, email your doctor's office, and request medication refills online. 1. In your internet browser, go to [email or web address] 2. Click on the First Time User? Click Here link in the Sign In box. You will see the New Member Sign Up page. 3. Enter your [employer] Access Code exactly as it appears below. You will not need to use this code after youve completed the sign-up process. If you do not sign up before the expiration date, you must request a new code. · [employer] Access Code: [email or web address] Expires: 3/20/2018 10:05 AM 
 
4. Enter the last four digits of your Social Security Number (xxxx) and Date of Birth (mm/dd/yyyy) as indicated and click Submit. You will be taken to the next sign-up page. 5. Create a Vubiquityt ID. This will be your [employer] login ID and cannot be changed, so think of one that is secure and easy to remember. 6. Create a [employer] password. You can change your password at any time. 7. Enter your Password Reset Question and Answer. This can be used at a later time if you forget your password. 8. Enter your e-mail address.  You will receive e-mail notification when new information is available in [employer]. 9. Click Sign Up. You can now view and download portions of your medical record. 10. Click the Download Summary menu link to download a portable copy of your medical information. If you have questions, please visit the Frequently Asked Questions section of the [employer] website. Remember, [employer] is NOT to be used for urgent needs. For medical emergencies, dial 911. Now available from your iPhone and Android! Please provide this summary of care documentation to your next provider. Your primary care clinician is listed as Safia Nixon. If you have any questions after today's visit, please call [phone].

## 2018-03-15 NOTE — PATIENT INSTRUCTIONS
1) Take tylenol for pain. Don't use more than 4000mg a day for pain. 2) Use heating pad, icy/hot, tiger balm for pain. 3) follow-up as needed or sooner if worsening symptoms. Shoulder Blade: Exercises  Your Care Instructions  Here are some examples of typical exercises for your condition. Start each exercise slowly. Ease off the exercise if you start to have pain. Your doctor or physical therapist will tell you when you can start these exercises and which ones will work best for you. How to do the exercises  Shoulder roll    1. Stand tall with your chin slightly tucked. Imagine that a string at the top of your head is pulling you straight up. 2. Keep your arms relaxed. All motion will be in your shoulders. 3. Shrug your shoulders up toward your ears, then up and back. [place] your shoulders down and back, like you're sliding your hands down into your back pants pockets. 4. Repeat the circles at least 2 to 4 times. 5. This exercise is also helpful anytime you want to relax. Lower neck and upper back stretch    1. With your arms about shoulder height, clasp your hands in front of you. 2. Drop your chin toward your chest.  3. Reach straight forward so you are rounding your upper back. Think about pulling your shoulder blades apart. Ollie Solders feel a stretch across your upper back and shoulders. Hold for at least 6 seconds. 4. Repeat 2 to 4 times. Triceps stretch    1. Reach your arm straight up. 2. Keeping your elbow in place, bend your arm and reach your hand down behind your back. 3. With your other hand, apply gentle pressure to the bent elbow. Ollie Solders feel a stretch at the back of your upper arm and shoulder. Hold about 6 seconds. 4. Repeat 2 to 4 times with each arm. Shoulder stretch    1. Relax your shoulders. 2. Raise one arm to shoulder height, and reach it across your chest.  3. Pull the arm slightly toward you with your other arm. This will help you get a gentle stretch.  Hold for about 6 seconds. 4. Repeat 2 to 4 times. Shoulder blade squeeze    1. Sit or stand up tall with your arms at your sides. 2. Keep your shoulders relaxed and down, not shrugged. 3. Squeeze your shoulder blades together. Hold for 6 seconds, then relax. 4. Repeat 8 to 12 times. Straight-arm shoulder blade squeeze    1. Sit or stand tall. Relax your shoulders. 2. With palms down, hold your elastic tubing or band straight out in front of you. 3. Start with slight tension in the tubing or band, with your hands about shoulder-width apart. 4. Slowly pull straight out to the sides, squeezing your shoulder blades together. Keep your arms straight and at shoulder height. Slowly release. 5. Repeat 8 to 12 times. Rowing    1. [place] your elastic tubing or band at about waist height. Take one end in each hand. 2. Sit or stand with your feet hip-width apart. 3. Hold your arms straight in front of you. Adjust your distance to create slight tension in the tubing or band. 4. Slightly tuck your chin. Relax your shoulders. 5. Without shrugging your shoulders, pull straight back. Your elbows will pass alongside your waist.  Pull-downs    1. [place] your elastic tubing or band in the top of a closed door. Take one end in each hand. 2. Either sit or stand, depending on what is more comfortable. If you feel unsteady, sit on a chair. 3. Start with your arms up and comfortably apart, elbows straight. There should be a slight tension in the tubing or band. 4. Slightly tuck your chin, and look straight ahead. 5. Keeping your back straight, slowly pull down and back, bending your elbows. 6. Stop where your hands are level with your chin, in a \"goalpost\" position. 7. Repeat 8 to 12 times. Chest T stretch    1. Lie on your back. Raise your knees so they are bent. Plant your feet on the floor, hip-width apart. 2. Tuck your chin, and relax your shoulders. 3. Reach your arms straight out to the sides.  If you don't feel a mild stretch in your shoulders and across your chest, use a foam roll or a tightly rolled blanket under your spine, from your tailbone to your head. 4. Relax in this position for at least 15 to 30 seconds while you breathe normally. Repeat 2 to 4 times. 5. As you get used to this stretch, keep adding a little more time until you are able relax in this position for 2 or 3 minutes. When you can relax for at least 2 minutes, you only need to do the exercise 1 time per session. Chest goalpost stretch    1. Lie on your back. Raise your knees so they are bent. Plant your feet on the floor, hip-width apart. 2. Tuck your chin, and relax your shoulders. 3. Reach your arms straight out to the sides. 4. Bend your arms at the elbows, with your hands pointed toward the top of your head. Your arms should make an L on either side of your head. Your palms should be facing up. 5. If you don't feel a mild stretch in your shoulders and across your chest, use a foam roll or tightly rolled blanket under your spine, from your tailbone to your head. 6. Relax in this position for at least 15 to 30 seconds while you breathe normally. Repeat 2 to 4 times. 7. Each day you do this exercise, add a little more time until you can relax in this position for 2 or 3 minutes. When you can relax for at least 2 minutes, you only need to do the exercise 1 time per session. Follow-up care is a key part of your treatment and safety. Be sure to make and go to all appointments, and call your doctor if you are having problems. It's also a good idea to know your test results and keep a list of the medicines you take. Where can you learn more? Go to http://alina-malena.info/. Enter [phone] in the search box to learn more about \"Shoulder Blade: Exercises. \"  Current as of: March 21, 2017  Content Version: 11.4  © 3947-6963 [employer], [employer].  Care instructions adapted under license by [employer] (which disclaims liability or warranty for this information). If you have questions about a medical condition or this instruction, always ask your healthcare professional. [address] any warranty or liability for your use of this information. Shoulder Instability: Exercises  Your Care Instructions  Here are some examples of typical rehabilitation exercises for your condition. Start each exercise slowly. Ease off the exercise if you start to have pain. Your doctor or physical therapist will tell you when you can start these exercises and which ones will work best for you. How to do the exercises  Shoulder flexion (lying down)    To make a wand, use a piece of PVC pipe or a broom handle with the broom removed. Make the wand about a foot wider than your shoulders. 6. Lie on your back, holding a wand with both hands. Your palms should face down as you hold the wand. 7. Keeping your elbows straight, slowly raise your arms over your head. Raise them until you feel a stretch in your shoulders, upper back, and chest.  8. Hold for 15 to 30 seconds. 9. Repeat 2 to 4 times. Shoulder blade squeeze    5. Stand with your arms at your sides, and squeeze your shoulder blades together. Do not raise your shoulders up as you squeeze. 6. Hold for 6 seconds. 7. Repeat 8 to 12 times. Resisted rows    For this exercise, you will need elastic exercise material, such as surgical tubing or Thera-Band. 5. Put the band around a solid object at about waist level. (A bedpost will work well.) Each hand should hold an end of the band. 6. With your elbows at your sides and bent to 90 degrees, pull the band back. Your shoulder blades should move toward each other. Return to the starting position. 7. Repeat 8 to 12 times. Internal rotator strengthening exercise    5. Start by tying a piece of elastic exercise material to a doorknob. You can use surgical tubing or Thera-Band.   6. Stand or sit with your shoulder relaxed and your elbow bent 90 degrees. Your upper arm should rest comfortably against your side. Squeeze a rolled towel between your elbow and your body for comfort. This will help keep your arm at your side. 7. Hold one end of the elastic band in the hand of the painful arm. 8. Slowly rotate your forearm toward your body until it touches your belly. Slowly move it back to where you started. 9. Keep your elbow and upper arm firmly tucked against the towel roll or at your side. 10. Repeat 8 to 12 times. External rotator strengthening exercise    5. Start by tying a piece of elastic exercise material to a doorknob. You can use surgical tubing or Thera-Band. (You may also hold one end of the band in each hand.)  6. Stand or sit with your shoulder relaxed and your elbow bent 90 degrees. Your upper arm should rest comfortably against your side. Squeeze a rolled towel between your elbow and your body for comfort. This will help keep your arm at your side. 7. Hold one end of the elastic band with the hand of the painful arm. 8. Start with your forearm across your belly. Slowly rotate the forearm out away from your body. Keep your elbow and upper arm tucked against the towel roll or the side of your body until you begin to feel tightness in your shoulder. Slowly move your arm back to where you started. 9. Repeat 8 to 12 times. Follow-up care is a key part of your treatment and safety. Be sure to make and go to all appointments, and call your doctor if you are having problems. It's also a good idea to know your test results and keep a list of the medicines you take. Where can you learn more? Go to http://alina-malena.info/. Enter S693 in the search box to learn more about \"Shoulder Instability: Exercises. \"  Current as of: March 21, 2017  Content Version: 11.4  © 1460-4706 Healthwise, [employer].  Care instructions adapted under license by [employer] (which disclaims liability or warranty for this information). If you have questions about a medical condition or this instruction, always ask your healthcare professional. [address] any warranty or liability for your use of this information. Shoulder Stretches: Exercises  Your Care Instructions  Here are some examples of exercises for your shoulder. Start each exercise slowly. Ease off the exercise if you start to have pain. Your doctor or physical therapist will tell you when you can start these exercises and which ones will work best for you. How to do the exercises  Shoulder stretch    10.  a doorway and place one arm against the door frame. Your elbow should be a little higher than your shoulder. 11. Relax your shoulders as you lean forward, allowing your chest and shoulder muscles to stretch. You can also turn your body slightly away from your arm to stretch the muscles even more. 12. Hold for 15 to 30 seconds. 13. Repeat 2 to 4 times with each arm. Shoulder and chest stretch    8. Shoulder and chest stretch  9. While sitting, relax your upper body so you slump slightly in your chair. 10. As you breathe in, straighten your back and open your arms out to the sides. 11. Gently pull your shoulder blades back and downward. 12. Hold for 15 to 30 seconds as your breathe normally. 13. Repeat 2 to 4 times. Overhead stretch    8. Reach up over your head with both arms. 9. Hold for 15 to 30 seconds. 10. Repeat 2 to 4 times. Follow-up care is a key part of your treatment and safety. Be sure to make and go to all appointments, and call your doctor if you are having problems. It's also a good idea to know your test results and keep a list of the medicines you take. Where can you learn more? Go to http://alina-malena.info/. Enter S254 in the search box to learn more about \"Shoulder Stretches: Exercises. \"  Current as of: March 21, 2017  Content Version: 11.4  © 4537-7452 Healthwise, [employer]. Care instructions adapted under license by [employer] (which disclaims liability or warranty for this information). If you have questions about a medical condition or this instruction, always ask your healthcare professional. [address] any warranty or liability for your use of this information.

## 2018-03-15 NOTE — PATIENT INSTRUCTIONS
Abnormal Uterine Bleeding: Care Instructions  Your Care Instructions    Abnormal uterine bleeding (AUB) is irregular bleeding from the uterus that is longer or heavier than usual or does not occur at your regular time. Sometimes it is caused by changes in hormone levels. It can also be caused by growths in the uterus, such as fibroids or polyps. Sometimes a cause cannot be found. You may have heavy bleeding when you are not expecting your period. Your doctor may suggest a pregnancy test, if you think you are pregnant. Follow-up care is a key part of your treatment and safety. Be sure to make and go to all appointments, and call your doctor if you are having problems. It's also a good idea to know your test results and keep a list of the medicines you take. How can you care for yourself at home? · Be safe with medicines. Take pain medicines exactly as directed. ¨ If the doctor gave you a prescription medicine for pain, take it as prescribed. ¨ If you are not taking a prescription pain medicine, ask your doctor if you can take an over-the-counter medicine. · You may be low in iron because of blood loss. Eat a balanced diet that is high in iron and vitamin C. Foods rich in iron include red meat, shellfish, eggs, beans, and leafy green vegetables. Talk to your doctor about whether you need to take iron pills or a multivitamin. When should you call for help? Call 911 anytime you think you may need emergency care. For example, call if:  ? · You passed out (lost consciousness). ?Call your doctor now or seek immediate medical care if:  ? · You have new or worse belly or pelvic pain. ? · You have severe vaginal bleeding. ? · You feel dizzy or lightheaded, or you feel like you may faint. ? Watch closely for changes in your health, and be sure to contact your doctor if:  ? · You think you may be pregnant. ? · Your bleeding gets worse. ? · You do not get better as expected.    Where can you learn more?  Go to http://alina-malena.info/. Enter R397 in the search box to learn more about \"Abnormal Uterine Bleeding: Care Instructions. \"  Current as of: October 13, 2016  Content Version: 11.4  © 1137-1346 [employer]. Care instructions adapted under license by [employer] (which disclaims liability or warranty for this information). If you have questions about a medical condition or this instruction, always ask your healthcare professional. [facility] any warranty or liability for your use of this information. Functional Ovarian Cyst: Care Instructions  Your Care Instructions    A functional ovarian cyst is a sac that forms on the surface of a woman's ovary during ovulation. The sac holds a maturing egg. Usually the sac goes away after the egg is released. But if the egg is not released, or if the sac closes up after the egg is released, the sac can swell up with fluid and form a cyst.  Functional ovarian cysts are different than ovarian growths caused by other problems, such as cancer. Most functional ovarian cysts cause no symptoms and go away on their own. Some cause mild pain. Others can cause severe pain when they rupture or bleed. Follow-up care is a key part of your treatment and safety. Be sure to make and go to all appointments, and call your doctor if you are having problems. It's also a good idea to know your test results and keep a list of the medicines you take. How can you care for yourself at home? · Use heat, such as a hot water bottle, a heating pad set on low, or a warm bath, to relax tense muscles and relieve cramping. · Be safe with medicines. Take pain medicines exactly as directed. ¨ If the doctor gave you a prescription medicine for pain, take it as prescribed. ¨ If you are not taking a prescription pain medicine, ask your doctor if you can take an over-the-counter medicine. · Avoid constipation.  Make sure you drink enough fluids and include fruits, vegetables, and fiber in your diet each day. Constipation does not cause ovarian cysts, but it may make your pelvic pain worse. When should you call for help? Call your doctor now or seek immediate medical care if:  ? · You have severe vaginal bleeding. ? · You have new or worse belly or pelvic pain. ? Watch closely for changes in your health, and be sure to contact your doctor if:  ? · You have unusual vaginal bleeding. ? · You do not get better as expected. Where can you learn more? Go to http://alina-malena.info/. Enter C202 in the search box to learn more about \"Functional Ovarian Cyst: Care Instructions. \"  Current as of: October 13, 2016  Content Version: 11.4  © 6735-0348 [employer]. Care instructions adapted under license by [employer] (which disclaims liability or warranty for this information). If you have questions about a medical condition or this instruction, always ask your healthcare professional. [place] [address] any warranty or liability for your use of this information.

## 2018-03-15 NOTE — PROGRESS NOTES
Chief Complaint   Patient presents with    Shoulder Pain     left shoulder pain for 1 year. No apparent injury       HPI:     Gustavo Hunter is a 24 y.o.  female with history of GERD  here for the above complaint. Left shoulder pain and shoulder blade that has been going on for 1 year. It comes and goes. Pain scale: 4/10. She has little bit of numbness or tingling on the 3rd-5th fingers and radiation down that arm. She has not tried anything for the pain. She was seeing a chiropracter without relief and did not have x-ray. She denies any chest pain, shortness of breath, abdominal pain, headaches or dizziness. Past Medical History:   Diagnosis Date    GERD (gastroesophageal reflux disease)      History reviewed. No pertinent surgical history. No current outpatient prescriptions on file. No current facility-administered medications for this visit. Health Maintenance   Topic Date Due    HPV AGE 9Y-34Y (1 of 3 - Female 3 Dose Series) 04/11/2003    PAP AKA CERVICAL CYTOLOGY  04/11/2013    DTaP/Tdap/Td series (2 - Td) 12/20/2027    Influenza Age 9 to Adult  Addressed       There is no immunization history on file for this patient. Patient's last menstrual period was 03/08/2018. Allergies and Intolerances:   No Known Allergies    Family History:   Family History   Problem Relation Age of Onset    Hypertension Mother     Hypertension Father        Social History:   She  reports that she has never smoked. She has never used smokeless tobacco.  She  reports that she drinks alcohol. ·     OBJECTIVE:   Physical exam:   Visit Vitals    /67 (BP 1 Location: Left arm, BP Patient Position: Sitting)    Pulse 81    Temp 96.2 °F (35.7 °C) (Oral)    Resp 15    Ht 5' 9\" (1.753 m)    Wt 126 lb 12.8 oz (57.5 kg)    LMP 03/08/2018    SpO2 99%    BMI 18.73 kg/m2        Generally: Pleasant female in no acute distress  Cardiac Exam: regular, rate, and rhythm.  Normal S1 and S2. No murmurs, gallops, or rubs  Pulmonary exam: Clear to auscultation bilaterally  Left shoulder exam: decrease ROM with movement above 90 degree angle. Pain with movement behind back. No pain with movement across chest. No TTP in the shoulder and scapular area. Strength: 5/5 in upper extremities bilaterally. Reflexes: 2/2 in upper extremities bilaterally. LABS/RADIOLOGICAL TESTS:  Lab Results   Component Value Date/Time    WBC 6.9 12/20/2017 09:14 AM    HGB 13.3 12/20/2017 09:14 AM    HCT 40.5 12/20/2017 09:14 AM    PLATELET 109 10/93/4081 09:14 AM     Lab Results   Component Value Date/Time    Sodium 137 12/20/2017 09:14 AM    Potassium 4.0 12/20/2017 09:14 AM    Chloride 102 12/20/2017 09:14 AM    CO2 23 12/20/2017 09:14 AM    Glucose 89 12/20/2017 09:14 AM    BUN 10 12/20/2017 09:14 AM    Creatinine 0.49 (L) 12/20/2017 09:14 AM     No results found for: CHOL, CHOLX, CHLST, CHOLV, HDL, LDL, LDLC, DLDLP, TGLX, TRIGL, TRIGP  No results found for: GPT    Previous labs. ASSESSMENT/PLAN:    1. Chronic left shoulder pain: will do x-ray. Our x-ray machine is done. She wants to go to [facility] to get the x-ray. She will use heating pad, icy/hot, tiger balm for pain. Given exercises. She can use tylenol, but no more than 4000mg a day. -     XR SHOULDER LT AP/LAT MIN 2 V; Future    2. Patient verbalized understanding and agreement with the plan. 3. Patient was given an after-visit summary. 4. Follow-up Disposition:  Return if symptoms worsen or fail to improve. or sooner if worsening symptoms.           Andrea Musa M.D.

## 2018-03-22 ENCOUNTER — TELEPHONE (OUTPATIENT)
Dept: INTERNAL MEDICINE CLINIC | Age: 28
End: 2018-03-22

## 2018-03-22 NOTE — TELEPHONE ENCOUNTER
2 pt. Identifiers confirmed. Pt. notified of below. She is still seeing her chiropractor so she does not feel she needs PT at this time. No other questions/concerns at this time.

## 2018-03-22 NOTE — TELEPHONE ENCOUNTER
Attempted to contact pt at  number, no answer. Lvm for pt to return call to office at [phone]. Will continue to try to contact pt.

## 2018-04-02 ENCOUNTER — HOSPITAL ENCOUNTER (OUTPATIENT)
Dept: ULTRASOUND IMAGING | Age: 28
Discharge: HOME | End: 2018-04-02
Attending: OBSTETRICS & GYNECOLOGY
Payer: COMMERCIAL

## 2018-04-02 DIAGNOSIS — N83.202 LEFT OVARIAN CYST: ICD-10-CM

## 2018-04-02 DIAGNOSIS — R10.2 PELVIC PAIN IN FEMALE: ICD-10-CM

## 2018-04-02 PROCEDURE — 76830 TRANSVAGINAL US NON-OB: CPT

## 2018-04-10 DIAGNOSIS — G89.29 CHRONIC LEFT SHOULDER PAIN: ICD-10-CM

## 2018-04-10 DIAGNOSIS — M25.512 CHRONIC LEFT SHOULDER PAIN: ICD-10-CM

## 2018-04-26 ENCOUNTER — OFFICE VISIT (OUTPATIENT)
Dept: OBGYN CLINIC | Age: 28
End: 2018-04-26

## 2018-04-26 VITALS
DIASTOLIC BLOOD PRESSURE: 79 MMHG | WEIGHT: 127 LBS | HEART RATE: 96 BPM | SYSTOLIC BLOOD PRESSURE: 123 MMHG | HEIGHT: 69 IN | BODY MASS INDEX: 18.81 KG/M2

## 2018-04-26 DIAGNOSIS — N83.202 LEFT OVARIAN CYST: Primary | ICD-10-CM

## 2018-04-26 DIAGNOSIS — R10.2 PELVIC PAIN IN FEMALE: ICD-10-CM

## 2018-04-26 RX ORDER — NORETHINDRONE ACETATE AND ETHINYL ESTRADIOL 1MG-20(21)
1 KIT ORAL DAILY
Qty: 1 PACKAGE | Refills: 11 | Status: SHIPPED | OUTPATIENT
Start: 2018-04-26 | End: 2020-06-29 | Stop reason: SDUPTHER

## 2018-04-26 NOTE — PATIENT INSTRUCTIONS
Combination Birth Control Pills: Care Instructions  Your Care Instructions    Combination birth control pills are used to prevent pregnancy. They give you a regular dose of the hormones estrogen and progestin. You take a hormone pill every day to prevent pregnancy. Birth control pills come in packs. The most common type has 3 weeks of hormone pills. Some packs have sugar pills (they do not contain any hormones) for the fourth week. During that fourth no-hormone week, you have your period. After the fourth week (28 days), you start a new pack. Some birth control pills are packaged in different ways. For example, some have hormone pills for the fourth week instead of sugar pills. Taking hormones for the entire month causes you to not have periods or to have fewer periods. Others are packaged so that you have a period every 3 months. Your doctor will tell you what type of pills you have. Follow-up care is a key part of your treatment and safety. Be sure to make and go to all appointments, and call your doctor if you are having problems. It's also a good idea to know your test results and keep a list of the medicines you take. How can you care for yourself at home? How do you take the pill? · Follow your doctor's instructions about when to start taking your pills. Use backup birth control, such as a condom, or don't have intercourse for 7 days after you start your pills. · Take your pills every day, at about the same time of day. To help yourself do this, try to take them when you do something else every day, such as brushing your teeth. What if you forget to take a pill? Always read the label for specific instructions, or call your doctor. Here are some basic guidelines:  · If you miss 1 hormone pill, take it as soon as you remember. Ask your doctor if you may need to use a backup birth control method, such as a condom, or not have intercourse.   · If you miss 2 or more hormone pills, take one as soon as you remember you forgot them. Then read the pill label or call your doctor about instructions on how to take your missed pills. Use a backup method of birth control or don't have intercourse for 7 days. Pregnancy is more likely if you miss more than 1 pill. · If you had intercourse, you can use emergency contraception, such as the morning-after pill (Plan B). You can use emergency contraception for up to 5 days after having had intercourse, but it works best if you take it right away. What else do you need to know? · The pill has side effects. ¨ You may have very light or skipped periods. ¨ You may have bleeding between periods (spotting). This usually decreases after 3 to 4 months. ¨ You may have mood changes, less interest in sex, or weight gain. · The pill may reduce acne, heavy bleeding and cramping, and symptoms of premenstrual syndrome. · Check with your doctor before you use any other medicines, including over-the-counter medicines, vitamins, herbal products, and supplements. Birth control hormones may not work as well to prevent pregnancy when combined with other medicines. · The pill doesn't protect against sexually transmitted infection (STIs), such as herpes or HIV/AIDS. If you're not sure whether your sex partner might have an STI, use a condom to protect against disease. When should you call for help? Call your doctor now or seek immediate medical care if:  ? · You have severe belly pain. ? · You have signs of a blood clot, such as:  ¨ Pain in your calf, back of the knee, thigh, or groin. ¨ Redness and swelling in your leg or groin. ? · You have blurred vision or other problems seeing. ? · You have a severe headache. ? · You have severe trouble breathing. ? Watch closely for changes in your health, and be sure to contact your doctor if:  ? · You think you might be pregnant. ? · You think you may be depressed.    ? · You think you may have been exposed to or have a sexually transmitted infection. Where can you learn more? Go to http://alina-malena.info/. Enter U955 in the search box to learn more about \"Combination Birth Control Pills: Care Instructions. \"  Current as of: March 16, 2017  Content Version: 11.4  © 3042-5718 [employer]. Care instructions adapted under license by [employer] (which disclaims liability or warranty for this information). If you have questions about a medical condition or this instruction, always ask your healthcare professional. [address] any warranty or liability for your use of this information.

## 2018-04-26 NOTE — PROGRESS NOTES
33 y/o G0 with chronic pelvic pain, worse with intercourse presents to the office for follow-up. She had an ovarian cyst and  Was sent for repeat imaging. She notes the pain is about the same- describes it as bandlike across her lower abdomen. She denies any change in partner, but does note she is busy with work and school. No concern for STD    Visit Vitals    /79    Pulse 96    Ht 5' 9\" (1.753 m)    Wt 127 lb (57.6 kg)    LMP 04/04/2018 (Exact Date)    BMI 18.75 kg/m2     Gen: A&OX3, NAD  Exam deferred    Ultrasound: IMPRESSION  IMPRESSION:     1. Interval decrease in size of slightly complex left ovarian cystic lesion,  nonspecific but likely resolving physiologic/hemorrhagic cyst.     2. Unremarkable sonographic appearance of the right ovary.     3. Bilateral variant blood flow seen.     4. Unremarkable sonographic appearance of the uterus and endometrial stripe. A/P:  33 y/o G0 with resolving left ovarian cyst.  Pelvic pain now mostly dyspareunia. Discussed many causes for pelvic pain. Will trial 3 months of ovarian suppression with OCPs. RTO if pain persists. The plan of care has been discussed with the patient. She has been given the opportunity to ask questions, which seem to have been answered satisfactorily.

## 2018-04-26 NOTE — MR AVS SNAPSHOT
Levi Baig [place] [address] [facility] [address] [facility] [phone] [id number] 
[phone] Patient: Avila Ray MRN: [id number] QFH:[id number] Visit Information Date & Time Provider Department Dept. Phone Encounter #  
 4/26/2018  2:15 PM Veronica Linares, [facility] [place] [phone] [id number] Upcoming Health Maintenance Date Due  
 HPV Age 9Y-34Y (1 of 3 - Female 3 Dose Series) 4/11/2003 PAP AKA CERVICAL CYTOLOGY 4/11/2013 Allergies as of 4/26/2018  Review Complete On: 4/26/2018 By: Veronica Linares MD  
 No Known Allergies Current Immunizations  Reviewed on 5/10/2016 No immunizations on file. Not reviewed this visit You Were Diagnosed With   
  
 Codes Comments Left ovarian cyst    -  Primary ICD-10-CM: A99.849 ICD-9-CM: 620.2 Pelvic pain in female     ICD-10-CM: R10.2 ICD-9-CM: 625.9 Vitals BP Pulse Height(growth percentile) Weight(growth percentile) LMP BMI  
 123/79 96 5' 9\" (1.753 m) 127 lb (57.6 kg) 04/04/2018 (Exact Date) 18.75 kg/m2 OB Status Smoking Status Having regular periods Never Smoker BMI and BSA Data Body Mass Index Body Surface Area 18.75 kg/m 2 1.67 m 2 Preferred Pharmacy Pharmacy Name Phone Steven Arriola [address] [place] [address],[address] [phone] Your Updated Medication List  
  
   
This list is accurate as of 4/26/18  2:17 PM.  Always use your most recent med list.  
  
  
  
  
 norethindrone-ethinyl estradiol 1 mg-20 mcg (21)/75 mg (7) Tab Commonly known as:  Nickola Semen FE 1/20 Take 1 Tab by mouth daily. Indications: Recurrent ovarian cyst  
  
  
  
  
Prescriptions Sent to Pharmacy Refills  
 norethindrone-ethinyl estradiol (JUNEL FE 1/20) 1 mg-20 mcg (21)/75 mg (7) tab 11 Sig: Take 1 Tab by mouth daily.  Indications: Recurrent ovarian cyst  
 Class: Normal  
 Pharmacy: [address] , [address] [place] [address] #: [phone] Route: Oral  
  
Patient Instructions Combination Birth Control Pills: Care Instructions Your Care Instructions Combination birth control pills are used to prevent pregnancy. They give you a regular dose of the hormones estrogen and progestin. You take a hormone pill every day to prevent pregnancy. Birth control pills come in packs. The most common type has 3 weeks of hormone pills. Some packs have sugar pills (they do not contain any hormones) for the fourth week. During that fourth no-hormone week, you have your period. After the fourth week (28 days), you start a new pack. Some birth control pills are packaged in different ways. For example, some have hormone pills for the fourth week instead of sugar pills. Taking hormones for the entire month causes you to not have periods or to have fewer periods. Others are packaged so that you have a period every 3 months. Your doctor will tell you what type of pills you have. Follow-up care is a key part of your treatment and safety. Be sure to make and go to all appointments, and call your doctor if you are having problems. It's also a good idea to know your test results and keep a list of the medicines you take. How can you care for yourself at home? How do you take the pill? · Follow your doctor's instructions about when to start taking your pills. Use backup birth control, such as a condom, or don't have intercourse for 7 days after you start your pills. · Take your pills every day, at about the same time of day. To help yourself do this, try to take them when you do something else every day, such as brushing your teeth. What if you forget to take a pill? Always read the label for specific instructions, or call your doctor. Here are some basic guidelines: · If you miss 1 hormone pill, take it as soon as you remember.  Ask your doctor if you may need to use a backup birth control method, such as a condom, or not have intercourse. · If you miss 2 or more hormone pills, take one as soon as you remember you forgot them. Then read the pill label or call your doctor about instructions on how to take your missed pills. Use a backup method of birth control or don't have intercourse for 7 days. Pregnancy is more likely if you miss more than 1 pill. · If you had intercourse, you can use emergency contraception, such as the morning-after pill (Plan B). You can use emergency contraception for up to 5 days after having had intercourse, but it works best if you take it right away. What else do you need to know? · The pill has side effects. ¨ You may have very light or skipped periods. ¨ You may have bleeding between periods (spotting). This usually decreases after 3 to 4 months. ¨ You may have mood changes, less interest in sex, or weight gain. · The pill may reduce acne, heavy bleeding and cramping, and symptoms of premenstrual syndrome. · Check with your doctor before you use any other medicines, including over-the-counter medicines, vitamins, herbal products, and supplements. Birth control hormones may not work as well to prevent pregnancy when combined with other medicines. · The pill doesn't protect against sexually transmitted infection (STIs), such as herpes or HIV/AIDS. If you're not sure whether your sex partner might have an STI, use a condom to protect against disease. When should you call for help? Call your doctor now or seek immediate medical care if: 
? · You have severe belly pain. ? · You have signs of a blood clot, such as: 
¨ Pain in your calf, back of the knee, thigh, or groin. ¨ Redness and swelling in your leg or groin. ? · You have blurred vision or other problems seeing. ? · You have a severe headache. ? · You have severe trouble breathing. ?Watch closely for changes in your health, and be sure to contact your doctor if: 
? · You think you might be pregnant. ? · You think you may be depressed. ? · You think you may have been exposed to or have a sexually transmitted infection. Where can you learn more? Go to http://alina-malena.info/. Enter J127 in the search box to learn more about \"Combination Birth Control Pills: Care Instructions. \" Current as of: March 16, 2017 Content Version: 11.4 © 6369-0128 [employer]. Care instructions adapted under license by [employer] (which disclaims liability or warranty for this information). If you have questions about a medical condition or this instruction, always ask your healthcare professional. [address] any warranty or liability for your use of this information. Introducing [facility] & HEALTH SERVICES! Ludy Ashley introduces [employer] patient portal. Now you can access parts of your medical record, email your doctor's office, and request medication refills online. 1. In your internet browser, go to [email or web address] 2. Click on the First Time User? Click Here link in the Sign In box. You will see the New Member Sign Up page. 3. Enter your [employer] Access Code exactly as it appears below. You will not need to use this code after youve completed the sign-up process. If you do not sign up before the expiration date, you must request a new code. · [employer] Access Code: [email or web address] Expires: 6/28/2018  2:52 PM 
 
4. Enter the last four digits of your Social Security Number (xxxx) and Date of Birth (mm/dd/yyyy) as indicated and click Submit. You will be taken to the next sign-up page. 5. Create a [employer] ID. This will be your [employer] login ID and cannot be changed, so think of one that is secure and easy to remember. 6. Create a MyAcademicProgram[employer] password. You can change your password at any time. 7. Enter your Password Reset Question and Answer. This can be used at a later time if you forget your password. 8. Enter your e-mail address. You will receive e-mail notification when new information is available in [address] 9. Click Sign Up. You can now view and download portions of your medical record. 10. Click the Download Summary menu link to download a portable copy of your medical information. If you have questions, please visit the Frequently Asked Questions section of the [employer] website. Remember, [employer] is NOT to be used for urgent needs. For medical emergencies, dial 911. Now available from your iPhone and Android! Please provide this summary of care documentation to your next provider. Your primary care clinician is listed as Safia Nixon. If you have any questions after today's visit, please call [phone].

## 2018-10-18 ENCOUNTER — OFFICE VISIT (OUTPATIENT)
Dept: INTERNAL MEDICINE CLINIC | Age: 28
End: 2018-10-18

## 2018-10-18 VITALS
BODY MASS INDEX: 18.51 KG/M2 | OXYGEN SATURATION: 98 % | HEART RATE: 71 BPM | HEIGHT: 69 IN | WEIGHT: 125 LBS | SYSTOLIC BLOOD PRESSURE: 118 MMHG | DIASTOLIC BLOOD PRESSURE: 77 MMHG | TEMPERATURE: 96.6 F | RESPIRATION RATE: 18 BRPM

## 2018-10-18 DIAGNOSIS — J06.9 UPPER RESPIRATORY TRACT INFECTION, UNSPECIFIED TYPE: Primary | ICD-10-CM

## 2018-10-18 RX ORDER — AZITHROMYCIN 250 MG/1
TABLET, FILM COATED ORAL
Qty: 6 TAB | Refills: 0 | Status: SHIPPED | OUTPATIENT
Start: 2018-10-18 | End: 2018-10-23

## 2018-10-18 NOTE — PROGRESS NOTES
ROOM # 1 Anmol Parisi presents today for Chief Complaint Patient presents with  Breathing Problem  Cough  
  non productive. afebrile Anmol Parisi preferred language for health care discussion is english/other. Is someone accompanying this pt? no 
 
Is the patient using any DME equipment during OV? no 
 
Depression Screening: PHQ over the last two weeks 10/18/2018 3/15/2018 12/20/2017 5/10/2016 Little interest or pleasure in doing things Not at all Not at all Not at all Not at all Feeling down, depressed, irritable, or hopeless Not at all Not at all Not at all Not at all Total Score PHQ 2 0 0 0 0 Learning Assessment: 
Learning Assessment 5/10/2016 PRIMARY LEARNER Patient PRIMARY LANGUAGE Guinean  
LEARNER PREFERENCE PRIMARY DEMONSTRATION  
  READING  
ANSWERED BY patient RELATIONSHIP SELF Abuse Screening: 
Abuse Screening Questionnaire 12/20/2017 Do you ever feel afraid of your partner? Juan Garsia Are you in a relationship with someone who physically or mentally threatens you? Juan Garsia Is it safe for you to go home? Nery Bautista Fall Risk No flowsheet data found. Health Maintenance reviewed and discussed per provider. Yes Anmol Parisi is due for Health Maintenance Due Topic Date Due  
 HPV Age 9Y-34Y (1 - Female 3-dose series) 04/11/2003  PAP AKA CERVICAL CYTOLOGY  04/11/2013 Please order/place referral if appropriate. Advance Directive: 1. Do you have an advance directive in place? Patient Reply: no 
 
2. If not, would you like material regarding how to put one in place? Patient Reply: no 
 
Coordination of Care: 1. Have you been to the ER, urgent care clinic since your last visit? Hospitalized since your last visit? no 
 
2. Have you seen or consulted any other health care providers outside of the [address] [place] [address] since your last visit? Include any pap smears or colon screening.  no

## 2018-10-18 NOTE — PATIENT INSTRUCTIONS
1) Follow-up as needed or sooner if worsening symptoms. Upper Respiratory Infection (Cold): Care Instructions Your Care Instructions An upper respiratory infection, or URI, is an infection of the nose, sinuses, or throat. URIs are spread by coughs, sneezes, and direct contact. The common cold is the most frequent kind of URI. The flu and sinus infections are other kinds of URIs. Almost all URIs are caused by viruses. Antibiotics won't cure them. But you can treat most infections with home care. This may include drinking lots of fluids and taking over-the-counter pain medicine. You will probably feel better in 4 to 10 days. The doctor has checked you carefully, but problems can develop later. If you notice any problems or new symptoms, get medical treatment right away. Follow-up care is a key part of your treatment and safety. Be sure to make and go to all appointments, and call your doctor if you are having problems. It's also a good idea to know your test results and keep a list of the medicines you take. How can you care for yourself at home? · To prevent dehydration, drink plenty of fluids, enough so that your urine is light yellow or clear like water. Choose water and other caffeine-free clear liquids until you feel better. If you have kidney, heart, or liver disease and have to limit fluids, talk with your doctor before you increase the amount of fluids you drink. · Take an over-the-counter pain medicine, such as acetaminophen (Tylenol), ibuprofen (Advil, Motrin), or naproxen (Aleve). Read and follow all instructions on the label. · Before you use cough and cold medicines, check the label. These medicines may not be safe for young children or for people with certain health problems. · Be careful when taking over-the-counter cold or flu medicines and Tylenol at the same time. Many of these medicines have acetaminophen, which is Tylenol.  Read the labels to make sure that you are not taking more than the recommended dose. Too much acetaminophen (Tylenol) can be harmful. · Get plenty of rest. 
· Do not smoke or allow others to smoke around you. If you need help quitting, talk to your doctor about stop-smoking programs and medicines. These can increase your chances of quitting for good. When should you call for help? Call 911 anytime you think you may need emergency care. For example, call if: 
  · You have severe trouble breathing.  
 Call your doctor now or seek immediate medical care if: 
  · You seem to be getting much sicker.  
  · You have new or worse trouble breathing.  
  · You have a new or higher fever.  
  · You have a new rash.  
 Watch closely for changes in your health, and be sure to contact your doctor if: 
  · You have a new symptom, such as a sore throat, an earache, or sinus pain.  
  · You cough more deeply or more often, especially if you notice more mucus or a change in the color of your mucus.  
  · You do not get better as expected. Where can you learn more? Go to http://alina-malena.info/. Enter U131 in the search box to learn more about \"Upper Respiratory Infection (Cold): Care Instructions. \" Current as of: December 6, 2017 Content Version: 11.8 © 3165-3877 [employer]wise[employer]. Care instructions adapted under license by [employer] (which disclaims liability or warranty for this information). If you have questions about a medical condition or this instruction, always ask your healthcare professional. [place] [address] any warranty or liability for your use of this information.

## 2018-10-18 NOTE — PROGRESS NOTES
Chief Complaint Patient presents with  Breathing Problem  Cough  
  non productive. afebrile HPI:  
 
Yolanda Benavides is a 34 y.o.  female with history of GERD  here for the above complaint. This has been going on for 1.5 weeks ago of dry cough and pain at lower throat area. She said she has pain mainly with exhaling. Sometimes shortness of breath and feels like losing voice. No fevers, chills, night sweats. She denies feeling of globus or dysphagia with solids or liquids. No chest pain, headaches, abdominal pain. She has sometimes dizziness. No nasal congestion. No wheezing. No burning sensation in chest.  
 
 
 
 
 
Past Medical History:  
Diagnosis Date  GERD (gastroesophageal reflux disease)  Left ovarian cyst 3/15/2018  Pelvic pain in female 3/15/2018 History reviewed. No pertinent surgical history. Current Outpatient Medications Medication Sig  
 azithromycin (ZITHROMAX) 250 mg tablet Take 2 tablets today, then take 1 tablet daily  norethindrone-ethinyl estradiol (JUNEL FE 1/20) 1 mg-20 mcg (21)/75 mg (7) tab Take 1 Tab by mouth daily. Indications: Recurrent ovarian cyst  
 
No current facility-administered medications for this visit. Health Maintenance Topic Date Due  
 HPV Age 9Y-34Y (1 - Female 3-dose series) 04/11/2003  PAP AKA CERVICAL CYTOLOGY  04/11/2013  Influenza Age 5 to Adult  11/01/2018 (Originally 8/1/2018)  DTaP/Tdap/Td series (2 - Td) 12/20/2027 There is no immunization history on file for this patient. No LMP recorded. Allergies and Intolerances:  
No Known Allergies Family History:  
Family History Problem Relation Age of Onset  Hypertension Mother  Hypertension Father Social History: She  reports that  has never smoked. she has never used smokeless tobacco.  She  reports that she drinks alcohol. ·  
 
OBJECTIVE:  
Physical exam:  
Visit Vitals /77 (BP 1 Location: Right arm, BP Patient Position: Sitting) Pulse 71 Temp 96.6 °F (35.9 °C) (Oral) Resp 18 Ht 5' 9\" (1.753 m) Wt 125 lb (56.7 kg) SpO2 98% BMI 18.46 kg/m² Generally: Pleasant female in no acute distress HEENT Exam: Head: atraumatic, acephalic Eyes: PERRLA Ears: bilaterally normal TM, no erythema or exudate, normal light reflex Nares: mucosal membranes moist, no erythema Mouth: Clear, no erythema or exudate Neck: supple, no LAD Cardiac Exam: regular, rate, and rhythm. Normal S1 and S2. No murmurs, gallops, or rubs Pulmonary exam: Clear to auscultation bilaterally LABS/RADIOLOGICAL TESTS: 
Lab Results Component Value Date/Time WBC 6.9 12/20/2017 09:14 AM  
 HGB 13.3 12/20/2017 09:14 AM  
 HCT 40.5 12/20/2017 09:14 AM  
 PLATELET 825 01/48/1056 09:14 AM  
 
Lab Results Component Value Date/Time Sodium 137 12/20/2017 09:14 AM  
 Potassium 4.0 12/20/2017 09:14 AM  
 Chloride 102 12/20/2017 09:14 AM  
 CO2 23 12/20/2017 09:14 AM  
 Glucose 89 12/20/2017 09:14 AM  
 BUN 10 12/20/2017 09:14 AM  
 Creatinine 0.49 (L) 12/20/2017 09:14 AM  
 
No results found for: CHOL, CHOLX, CHLST, CHOLV, HDL, LDL, LDLC, DLDLP, TGLX, TRIGL, TRIGP No results found for: GPT Previous labs ASSESSMENT/PLAN:   
1. Upper respiratory tract infection, unspecified type 
-     azithromycin (ZITHROMAX) 250 mg tablet; Take 2 tablets today, then take 1 tablet daily 2. She will use OTC cough medication. 3.  
Requested Prescriptions Signed Prescriptions Disp Refills  azithromycin (ZITHROMAX) 250 mg tablet 6 Tab 0 Sig: Take 2 tablets today, then take 1 tablet daily 4. Patient verbalized understanding and agreement with the plan. 5. Patient was given an after-visit summary. 6.  
Follow-up Disposition: 
Return if symptoms worsen or fail to improve. or sooner if worsening symptoms.  
 
 
 
 
David Rosado M.D.

## 2018-12-19 ENCOUNTER — OFFICE VISIT (OUTPATIENT)
Dept: OBGYN CLINIC | Age: 28
End: 2018-12-19

## 2018-12-19 VITALS
SYSTOLIC BLOOD PRESSURE: 136 MMHG | DIASTOLIC BLOOD PRESSURE: 93 MMHG | HEART RATE: 102 BPM | RESPIRATION RATE: 18 BRPM | HEIGHT: 69 IN | WEIGHT: 129 LBS | BODY MASS INDEX: 19.11 KG/M2 | TEMPERATURE: 98 F

## 2018-12-19 DIAGNOSIS — N83.202 LEFT OVARIAN CYST: Primary | ICD-10-CM

## 2018-12-19 NOTE — PROGRESS NOTES
35 y/o G0 presents to the office for follow-up. She was placed on OCPs for ovarian suppression. She notes the pain has improved. She has some pain with intercourse intermittently. She denies any other concerns. Visit Vitals  BP (!) 136/93   Pulse (!) 102   Temp 98 °F (36.7 °C) (Oral)   Resp 18   Ht 5' 9\" (1.753 m)   Wt 129 lb (58.5 kg)   LMP 11/29/2018 (Exact Date)   BMI 19.05 kg/m²     Gen: A&Ox3, NAD  Abdomen: flat, soft, NT  SVE: NEFG, small mobile uterus- NTTP    A/P  Improved pelvic pain. Continue ovarian suppression with OCPs. Will re-evaluate the ovarian cyst.  The plan of care has been discussed with the patient. She has been given the opportunity to ask questions, which seem to have been answered satisfactorily.

## 2018-12-24 ENCOUNTER — HOSPITAL ENCOUNTER (OUTPATIENT)
Dept: ULTRASOUND IMAGING | Age: 28
Discharge: HOME | End: 2018-12-24
Attending: OBSTETRICS & GYNECOLOGY
Payer: COMMERCIAL

## 2018-12-24 DIAGNOSIS — N83.202 LEFT OVARIAN CYST: ICD-10-CM

## 2018-12-24 PROCEDURE — 76830 TRANSVAGINAL US NON-OB: CPT

## 2018-12-28 ENCOUNTER — TELEPHONE (OUTPATIENT)
Dept: OBGYN CLINIC | Age: 28
End: 2018-12-28

## 2018-12-31 ENCOUNTER — TELEPHONE (OUTPATIENT)
Dept: OBGYN CLINIC | Age: 28
End: 2018-12-31

## 2018-12-31 NOTE — TELEPHONE ENCOUNTER
----- Message from Bryon Ng MD sent at 12/27/2018  5:14 PM EST -----  Ovarian cyst has resolved. Right ovary larger than left. Please inform.

## 2019-05-01 ENCOUNTER — OFFICE VISIT (OUTPATIENT)
Dept: OBGYN CLINIC | Age: 29
End: 2019-05-01

## 2019-05-01 ENCOUNTER — HOSPITAL ENCOUNTER (OUTPATIENT)
Dept: LAB | Age: 29
Discharge: HOME | End: 2019-05-01

## 2019-05-01 VITALS
DIASTOLIC BLOOD PRESSURE: 76 MMHG | TEMPERATURE: 98 F | WEIGHT: 127 LBS | HEART RATE: 70 BPM | HEIGHT: 69 IN | SYSTOLIC BLOOD PRESSURE: 113 MMHG | BODY MASS INDEX: 18.81 KG/M2

## 2019-05-01 DIAGNOSIS — N92.6 IRREGULAR MENSES: ICD-10-CM

## 2019-05-01 DIAGNOSIS — N92.6 IRREGULAR MENSES: Primary | ICD-10-CM

## 2019-05-01 PROCEDURE — 99001 SPECIMEN HANDLING PT-LAB: CPT

## 2019-05-01 NOTE — PROGRESS NOTES
33 y/o G0 presents for continuing pelvic pain on the left side. Ultrasound done on Xmas chidi confirmed cyst resolution and larger right ovarian volume than left. She rates the pain as a variable anywhere from 3 to 8. She also notes her cycles are irregular. LMP was 4/12 and the one prior to that in February. She notes increasingly irregular cycles. She also has had acne since a teenager. She states when she took the OCPs, the cycle was regular, but she continued to have nagging pain. She doesn't desire children at this time. Visit Vitals  /76 (BP 1 Location: Right arm, BP Patient Position: Sitting)   Pulse 70   Temp 98 °F (36.7 °C) (Oral)   Ht 5' 9\" (1.753 m)   Wt 127 lb (57.6 kg)   LMP 04/12/2019   BMI 18.75 kg/m²     Gen: A&Ox3, NAD  Exam deferred    Labs: pending    A/P  PCOS definition by clinical criteria. She notes continued nagging pain despite light menses. She discontinued OCPs. Long discussion about chronicity of PCOS. Recommend hormonal suppression. Labs today. Will consider combination OCP choice of either Nuva ring or Ortho evra. Declined surgical option. The plan of care has been discussed with the patient. She has been given the opportunity to ask questions, which seem to have been answered satisfactorily. 15 minutes spent with this patient.

## 2019-05-01 NOTE — PROGRESS NOTES
Nancie Chino presents today for   Chief Complaint   Patient presents with    Irregular Menses     patient in room 3    Abdominal Pain       Is someone accompanying this pt? no  atient does have  concerns at this time. , patient is concerned about about irregular cycles   Patient dose not  birth control at this time . Depression screening     Is the patient using any DME equipment during OV? No      Depression Screening:  3 most recent PHQ Screens 10/18/2018   Little interest or pleasure in doing things Not at all   Feeling down, depressed, irritable, or hopeless Not at all   Total Score PHQ 2 0       Learning Assessment:  Learning Assessment 5/10/2016   PRIMARY LEARNER Patient   PRIMARY LANGUAGE Mosotho   LEARNER PREFERENCE PRIMARY DEMONSTRATION     READING   ANSWERED BY patient   RELATIONSHIP SELF       Abuse Screening:  Abuse Screening Questionnaire 12/20/2017   Do you ever feel afraid of your partner? N   Are you in a relationship with someone who physically or mentally threatens you? N   Is it safe for you to go home? Y       Fall Risk  No flowsheet data found. This Visit Test  Results for orders placed or performed in visit on 12/20/17   CBC WITH AUTOMATED DIFF   Result Value Ref Range    WBC 6.9 3.4 - 10.8 x10E3/uL    RBC 4.41 3.77 - 5.28 x10E6/uL    HGB 13.3 11.1 - 15.9 g/dL    HCT 40.5 34.0 - 46.6 %    MCV 92 79 - 97 fL    MCH 30.2 26.6 - 33.0 pg    MCHC 32.8 31.5 - 35.7 g/dL    RDW 13.0 12.3 - 15.4 %    PLATELET 156 149 - 340 x10E3/uL    NEUTROPHILS 44 Not Estab. %    Lymphocytes 45 Not Estab. %    MONOCYTES 9 Not Estab. %    EOSINOPHILS 2 Not Estab. %    BASOPHILS 0 Not Estab. %    ABS. NEUTROPHILS 3.0 1.4 - 7.0 x10E3/uL    Abs Lymphocytes 3.1 0.7 - 3.1 x10E3/uL    ABS. MONOCYTES 0.6 0.1 - 0.9 x10E3/uL    ABS. EOSINOPHILS 0.2 0.0 - 0.4 x10E3/uL    ABS. BASOPHILS 0.0 0.0 - 0.2 x10E3/uL    IMMATURE GRANULOCYTES 0 Not Estab. %    ABS. IMM.  GRANS. 0.0 0.0 - 0.1 D72A2/PS   METABOLIC PANEL, COMPREHENSIVE   Result Value Ref Range    Glucose 89 65 - 99 mg/dL    BUN 10 6 - 20 mg/dL    Creatinine 0.49 (L) 0.57 - 1.00 mg/dL    GFR est non- >59 mL/min/1.73    GFR est  >59 mL/min/1.73    BUN/Creatinine ratio 20 9 - 23    Sodium 137 134 - 144 mmol/L    Potassium 4.0 3.5 - 5.2 mmol/L    Chloride 102 96 - 106 mmol/L    CO2 23 18 - 29 mmol/L    Calcium 9.0 8.7 - 10.2 mg/dL    Protein, total 7.1 6.0 - 8.5 g/dL    Albumin 4.5 3.5 - 5.5 g/dL    GLOBULIN, TOTAL 2.6 1.5 - 4.5 g/dL    A-G Ratio 1.7 1.2 - 2.2    Bilirubin, total 0.7 0.0 - 1.2 mg/dL    Alk. phosphatase 55 39 - 117 IU/L    AST (SGOT) 21 0 - 40 IU/L    ALT (SGPT) 28 0 - 32 IU/L   URINALYSIS W/ RFLX MICROSCOPIC   Result Value Ref Range    Specific Gravity 1.023 1.005 - 1.030    pH (UA) 5.5 5.0 - 7.5    Color Yellow Yellow    Appearance Clear Clear    Leukocyte Esterase Negative Negative    Protein Negative Negative/Trace    Glucose Negative Negative    Ketone Negative Negative    Blood Negative Negative    Bilirubin Negative Negative    Urobilinogen 0.2 0.2 - 1.0 mg/dL    Nitrites Negative Negative    Microscopic Examination Comment        Health Maintenance reviewed and discussed and ordered per Provider. Health Maintenance Due   Topic Date Due    PAP AKA CERVICAL CYTOLOGY  04/11/2013   . Coordination of Care:  1. Have you been to the ER, urgent care clinic since your last visit? Hospitalized since your last visit? no    2. Have you seen or consulted any other health care providers outside of the [address] [place] [address] since your last visit? Include any pap smears or colon screening.  No

## 2019-05-01 NOTE — PATIENT INSTRUCTIONS
Polycystic Ovary Syndrome: Care Instructions  Your Care Instructions  Polycystic ovary syndrome, or PCOS, means a woman's hormones are out of balance. It can cause problems with your periods and make it hard to get pregnant. Doctors don't know for sure what causes PCOS, but it seems to run in families. It also seems to be linked to obesity and a risk for diabetes. If you have PCOS, your sisters and daughters have a higher chance of getting it too. You may have other symptoms. These include weight gain, acne, too much hair growth on the face or body, high blood pressure, and high blood sugar. Your ovaries may have cysts on them. These cysts are growths filled with fluid. Keep in mind that although you may not have regular periods, you can still get pregnant. Talk to your doctor about birth control if you do not want to get pregnant. Sometimes the hormone changes with PCOS can also make it hard for some women to get pregnant. If this is a concern, talk to your doctor about treatment for this problem. Women who have PCOS can go for months or longer with no period. Your doctor may recommend medicines that can help get your cycles back to normal.  Follow-up care is a key part of your treatment and safety. Be sure to make and go to all appointments, and call your doctor if you are having problems. It's also a good idea to know your test results and keep a list of the medicines you take. How can you care for yourself at home? · Take your medicines exactly as prescribed. Call your doctor if you think you are having a problem with your medicine. · Eat a healthy diet. Include fruits, vegetables, beans, and whole grains in your diet each day. · If you are overweight, losing weight can help with many of the symptoms of PCOS. Talk to your doctor about safe ways to lose weight. · Get at least 30 minutes of exercise on most days of the week. Walking is a good choice.  Or you can run, swim, cycle, or play tennis or team sports. · For hair growth you don't want, try bleaching, plucking, electrolysis, or laser therapy. · Acne can be treated with over-the-counter medicines. Look for ones that have benzoyl peroxide or salicylic acid in them. When should you call for help? Call your doctor now or seek immediate medical care if:    · You have severe vaginal bleeding.     · You have new or worse belly or pelvic pain.    Watch closely for changes in your health, and be sure to contact your doctor if:    · You do not get better as expected.     · You have unusual vaginal bleeding. Where can you learn more? Go to http://alina-malena.info/. Enter Z087 in the search box to learn more about \"Polycystic Ovary Syndrome: Care Instructions. \"  Current as of: May 14, 2018  Content Version: 11.9  © 1739-9232 [employer]. Care instructions adapted under license by [facility] (which disclaims liability or warranty for this information). If you have questions about a medical condition or this instruction, always ask your healthcare professional. [address] any warranty or liability for your use of this information.

## 2019-05-03 LAB
SPECIMEN STATUS REPORT, ROLRST: NORMAL
TESTOST SERPL-MCNC: 20 NG/DL (ref 8–48)

## 2019-05-04 LAB
DHEA SERPL-MCNC: 299 NG/DL (ref 31–701)
ESTRADIOL SERPL-MCNC: 47 PG/ML
SPECIMEN STATUS REPORT, ROLRST: NORMAL
T4 FREE SERPL-MCNC: 1.52 NG/DL (ref 0.82–1.77)
TSH SERPL DL<=0.005 MIU/L-ACNC: 1.35 UIU/ML (ref 0.45–4.5)

## 2019-05-21 ENCOUNTER — TELEPHONE (OUTPATIENT)
Dept: OBGYN CLINIC | Age: 29
End: 2019-05-21

## 2019-05-21 NOTE — TELEPHONE ENCOUNTER
Catracho Teran called to see if her blood work was back from her appointment on May 1st .    Please advise. Thank you .

## 2019-06-04 ENCOUNTER — TELEPHONE (OUTPATIENT)
Dept: OBGYN CLINIC | Age: 29
End: 2019-06-04

## 2019-06-04 NOTE — TELEPHONE ENCOUNTER
Patient called stating that she would like for someone to call her with her lab results. She stated she called two weeks ago and no one never returned her call.  Please advise

## 2019-06-18 LAB — XX-LABCORP SPECIMEN COL,LCBCF: NORMAL

## 2019-07-15 ENCOUNTER — OFFICE VISIT (OUTPATIENT)
Dept: INTERNAL MEDICINE CLINIC | Age: 29
End: 2019-07-15

## 2019-07-15 VITALS
RESPIRATION RATE: 17 BRPM | HEART RATE: 78 BPM | OXYGEN SATURATION: 97 % | HEIGHT: 69 IN | BODY MASS INDEX: 18.75 KG/M2 | DIASTOLIC BLOOD PRESSURE: 77 MMHG | SYSTOLIC BLOOD PRESSURE: 114 MMHG | TEMPERATURE: 97.8 F | WEIGHT: 126.6 LBS

## 2019-07-15 DIAGNOSIS — Z11.1 SCREENING-PULMONARY TB: ICD-10-CM

## 2019-07-15 DIAGNOSIS — Z01.84 IMMUNITY STATUS TESTING: Primary | ICD-10-CM

## 2019-07-15 NOTE — PROGRESS NOTES
Chief Complaint   Patient presents with    Immunization/Injection       HPI:     Darryle Novas is a 34 y.o.  female with history of  GERD and left ovarian cyst  here for the above complaint. She denies any chest pain, shortness ofbreath, abdominal pain. She needs lab test for TB and varicella titer. She has had the BCG vaccine in [place]. Past Medical History:   Diagnosis Date    GERD (gastroesophageal reflux disease)     Left ovarian cyst 3/15/2018    Pelvic pain in female 3/15/2018     History reviewed. No pertinent surgical history. Current Outpatient Medications   Medication Sig    norethindrone-ethinyl estradiol (JUNEL FE 1/20) 1 mg-20 mcg (21)/75 mg (7) tab Take 1 Tab by mouth daily. Indications: Recurrent ovarian cyst     No current facility-administered medications for this visit. Health Maintenance   Topic Date Due    PAP AKA CERVICAL CYTOLOGY  04/11/2013    DTaP/Tdap/Td series (1 - Tdap) 01/26/2021 (Originally 4/11/2013)    Influenza Age 5 to Adult  08/01/2019    Pneumococcal 0-64 years  Aged Out       There is no immunization history on file for this patient. No LMP recorded. Allergies and Intolerances:   No Known Allergies    Family History:   Family History   Problem Relation Age of Onset    Hypertension Mother     Hypertension Father        Social History:   She  reports that she has never smoked. She has never used smokeless tobacco.  She  reports that she drinks alcohol. OBJECTIVE:   Physical exam:   Visit Vitals  /77 (BP 1 Location: Left arm, BP Patient Position: Sitting)   Pulse 78   Temp 97.8 °F (36.6 °C) (Oral)   Resp 17   Ht 5' 9\" (1.753 m)   Wt 126 lb 9.6 oz (57.4 kg)   SpO2 97%   BMI 18.70 kg/m²        Generally: Pleasant female in no acute distress  Cardiac Exam: regular, rate, and rhythm. Normal S1 and S2.  No murmurs, gallops, or rubs  Pulmonary exam: Clear to auscultation bilaterally  Abdominal exam: Positive bowel sounds in all four quadrants, soft, nondistended, nontender  Extremities: 2+ dorsalis pedis pulses bilaterally. No pedal edema    bilaterally    LABS/RADIOLOGICAL TESTS:  Lab Results   Component Value Date/Time    WBC 6.9 12/20/2017 09:14 AM    HGB 13.3 12/20/2017 09:14 AM    HCT 40.5 12/20/2017 09:14 AM    PLATELET 359 72/65/1007 09:14 AM     Lab Results   Component Value Date/Time    Sodium 137 12/20/2017 09:14 AM    Potassium 4.0 12/20/2017 09:14 AM    Chloride 102 12/20/2017 09:14 AM    CO2 23 12/20/2017 09:14 AM    Glucose 89 12/20/2017 09:14 AM    BUN 10 12/20/2017 09:14 AM    Creatinine 0.49 (L) 12/20/2017 09:14 AM     No results found for: CHOL, CHOLX, CHLST, CHOLV, HDL, LDL, LDLC, DLDLP, TGLX, TRIGL, TRIGP  No results found for: GPT  Previous labs    ASSESSMENT/PLAN:    1. Immunity status testing  -     VZV AB, IGG; Future    2. Screening-pulmonary TB  -     QUANTIFERON-TB GOLD PLUS; Future      3. Patient verbalized understanding and agreement with the plan. 4. Patient was given an after-visit summary. 5. Follow-up as needed or sooner if worsening symptoms.           Najma Adhikari M.D.

## 2019-07-15 NOTE — PROGRESS NOTES
ROOM # 2  Identified pt with two pt identifiers(name and ). Reviewed record in preparation for visit and have obtained necessary documentation. Chief Complaint   Patient presents with    Immunization/Injection      Evie Cordova preferred language for health care discussion is english/other. Is the patient using any DME equipment during OV? Jimenez Carrasco is due for:  Health Maintenance Due   Topic    PAP AKA CERVICAL CYTOLOGY      Health Maintenance reviewed and discussed per provider  Please order/place referral if appropriate. Advance Directive:  1. Do you have an advance directive in place? Patient Reply: NO    2. If not, would you like material regarding how to put one in place? NO    Coordination of Care:  1. Have you been to the ER, urgent care clinic since your last visit? Hospitalized since your last visit? NO    2. Have you seen or consulted any other health care providers outside of the [address] [place] [address] since your last visit? Include any pap smears or colon screening. NO    Patient is accompanied by self I have received verbal consent from Evie Cordova to discuss any/all medical information while they are present in the room. Learning Assessment:  Learning Assessment 5/10/2016   PRIMARY LEARNER Patient   PRIMARY LANGUAGE Montenegrin   LEARNER PREFERENCE PRIMARY DEMONSTRATION     READING   ANSWERED BY patient   RELATIONSHIP SELF     Depression Screening:  3 most recent PHQ Screens 7/15/2019 10/18/2018 3/15/2018 2017 5/10/2016   Little interest or pleasure in doing things Not at all Not at all Not at all Not at all Not at all   Feeling down, depressed, irritable, or hopeless - Not at all Not at all Not at all Not at all   Total Score PHQ 2 - 0 0 0 0     Abuse Screening:  Abuse Screening Questionnaire 2017   Do you ever feel afraid of your partner? N   Are you in a relationship with someone who physically or mentally threatens you?  N   Is it safe for you to go home? Y     Fall Risk  n/i

## 2019-07-18 LAB
GAMMA INTERFERON BACKGROUND BLD IA-ACNC: 0.05 IU/ML
M TB IFN-G BLD-IMP: NEGATIVE
M TB IFN-G CD4+ BCKGRND COR BLD-ACNC: 0.04 IU/ML
MITOGEN IGNF BLD-ACNC: >10 IU/ML
QUANTIFERON INCUBATION, QF1T: NORMAL
QUANTIFERON TB2 AG: 0.04 IU/ML
SERVICE CMNT-IMP: NORMAL
VZV IGG SER IA-ACNC: 298 INDEX

## 2020-06-29 ENCOUNTER — OFFICE VISIT (OUTPATIENT)
Dept: OBGYN CLINIC | Age: 30
End: 2020-06-29

## 2020-06-29 VITALS
BODY MASS INDEX: 19.34 KG/M2 | DIASTOLIC BLOOD PRESSURE: 68 MMHG | WEIGHT: 130.6 LBS | HEART RATE: 92 BPM | SYSTOLIC BLOOD PRESSURE: 103 MMHG | RESPIRATION RATE: 20 BRPM | HEIGHT: 69 IN | OXYGEN SATURATION: 97 %

## 2020-06-29 DIAGNOSIS — N83.202 LEFT OVARIAN CYST: ICD-10-CM

## 2020-06-29 DIAGNOSIS — N92.6 IRREGULAR MENSES: Primary | ICD-10-CM

## 2020-06-29 DIAGNOSIS — E28.2 PCOS (POLYCYSTIC OVARIAN SYNDROME): ICD-10-CM

## 2020-06-29 LAB
HCG URINE, QL. (POC): NEGATIVE
VALID INTERNAL CONTROL?: YES

## 2020-06-29 RX ORDER — NORETHINDRONE ACETATE AND ETHINYL ESTRADIOL 1MG-20(21)
1 KIT ORAL DAILY
Qty: 3 PACKAGE | Refills: 4 | Status: SHIPPED | OUTPATIENT
Start: 2020-06-29

## 2020-06-29 NOTE — PROGRESS NOTES
1. Have you been to the ER, urgent care clinic since your last visit? Hospitalized since your last visit? No    2. Have you seen or consulted any other health care providers outside of the [address] [place] [address] since your last visit? Include any pap smears or colon screening.  No

## 2020-06-29 NOTE — PROGRESS NOTES
Subjective:   31 y.o. female for Well Woman Check. She has noted cycles are still every 2-3 months with nagging pain on left- has noted cysts. No LMP recorded. Social History: single partner, contraception - none. Pertinent past medical hstory: no history of HTN, DVT, CAD, DM, liver disease, migraines or smoking. Patient Active Problem List   Diagnosis Code    GERD (gastroesophageal reflux disease) K21.9    Left ovarian cyst N83.202    Pelvic pain in female R10.2     Current Outpatient Medications   Medication Sig Dispense Refill    norethindrone-ethinyl estradiol (JUNEL FE 1/20) 1 mg-20 mcg (21)/75 mg (7) tab Take 1 Tab by mouth daily. Indications: Recurrent ovarian cyst 1 Package 11     No Known Allergies  Past Medical History:   Diagnosis Date    GERD (gastroesophageal reflux disease)     Left ovarian cyst 3/15/2018    Pelvic pain in female 3/15/2018     No past surgical history on file. Family History   Problem Relation Age of Onset    Hypertension Mother     Hypertension Father      Social History     Tobacco Use    Smoking status: Never Smoker    Smokeless tobacco: Never Used   Substance Use Topics    Alcohol use: Yes     Comment: occasional drinks        ROS:  Feeling well. No dyspnea or chest pain on exertion. No abdominal pain, change in bowel habits, black or bloody stools. No urinary tract symptoms. GYN ROS: no breast pain or new or enlarging lumps on self exam, she complains of irregular menses. No neurological complaints. Objective:     Visit Vitals  /68   Pulse 92   Resp 20   Ht 5' 9\" (1.753 m)   Wt 130 lb 9.6 oz (59.2 kg)   SpO2 97%   BMI 19.29 kg/m²     The patient appears well, alert, oriented x 3, in no distress. ENT normal.  Neck supple. No adenopathy or thyromegaly. KATIE. Lungs are clear, good air entry, no wheezes, rhonchi or rales. S1 and S2 normal, no murmurs, regular rate and rhythm. Abdomen soft without tenderness, guarding, mass or organomegaly. Extremities show no edema, normal peripheral pulses. Neurological is normal, no focal findings. BREAST EXAM: breasts appear normal, no suspicious masses, no skin or nipple changes or axillary nodes    PELVIC EXAM: VULVA: normal appearing vulva with no masses, tenderness or lesions, VAGINA: normal appearing vagina with normal color and discharge, no lesions, CERVIX: normal appearing cervix without discharge or lesions, UTERUS: uterus is normal size, shape, consistency and nontender, ADNEXA: normal adnexa in size, nontender and no masses    Assessment/Plan:   well woman with PCOS and irregular cycles. She desires cycle regulation and would like to restart on OCPs. no contraindication to begin use of oral contraceptives  pap smear  counseled on breast self exam, use and side effects of OCP's and family planning choices  return annually or prn    ICD-10-CM ICD-9-CM    1. Irregular menses N92.6 626.4 norethindrone-ethinyl estradiol (JUNEL FE 1/20) 1 mg-20 mcg (21)/75 mg (7) tab   2. Left ovarian cyst N83.202 620.2 norethindrone-ethinyl estradiol (JUNEL FE 1/20) 1 mg-20 mcg (21)/75 mg (7) tab   3. PCOS (polycystic ovarian syndrome) E28.2 256.4 norethindrone-ethinyl estradiol (JUNEL FE 1/20) 1 mg-20 mcg (21)/75 mg (7) tab   .

## 2020-06-30 LAB — PAP IMAGE GUIDED, 8900296: NORMAL

## 2022-03-19 PROBLEM — R10.2 PELVIC PAIN IN FEMALE: Status: ACTIVE | Noted: 2018-03-15

## 2022-03-19 PROBLEM — N83.202 LEFT OVARIAN CYST: Status: ACTIVE | Noted: 2018-03-15

## 2023-01-31 RX ORDER — NORETHINDRONE ACETATE AND ETHINYL ESTRADIOL 1MG-20(21)
1 KIT ORAL DAILY
COMMUNITY
Start: 2020-06-29